# Patient Record
Sex: MALE | Race: WHITE | NOT HISPANIC OR LATINO | Employment: FULL TIME | URBAN - METROPOLITAN AREA
[De-identification: names, ages, dates, MRNs, and addresses within clinical notes are randomized per-mention and may not be internally consistent; named-entity substitution may affect disease eponyms.]

---

## 2017-01-09 ENCOUNTER — TELEPHONE (OUTPATIENT)
Dept: PULMONOLOGY | Facility: HOSPICE | Age: 42
End: 2017-01-09

## 2017-01-09 NOTE — TELEPHONE ENCOUNTER
Called and left the pt a  mssg to return my call.  He has an appt scheduled for tomorrow with Dr. Munroe and he did not show for his sleep study.

## 2017-05-23 ENCOUNTER — OFFICE VISIT (OUTPATIENT)
Dept: URGENT CARE | Facility: PHYSICIAN GROUP | Age: 42
End: 2017-05-23
Payer: COMMERCIAL

## 2017-05-23 VITALS
RESPIRATION RATE: 16 BRPM | SYSTOLIC BLOOD PRESSURE: 100 MMHG | BODY MASS INDEX: 39.23 KG/M2 | HEIGHT: 73 IN | OXYGEN SATURATION: 96 % | DIASTOLIC BLOOD PRESSURE: 70 MMHG | WEIGHT: 296 LBS | HEART RATE: 75 BPM | TEMPERATURE: 97.3 F

## 2017-05-23 DIAGNOSIS — R05.9 COUGH: ICD-10-CM

## 2017-05-23 DIAGNOSIS — J98.8 RTI (RESPIRATORY TRACT INFECTION): ICD-10-CM

## 2017-05-23 PROCEDURE — 99214 OFFICE O/P EST MOD 30 MIN: CPT | Performed by: PHYSICIAN ASSISTANT

## 2017-05-23 RX ORDER — PROMETHAZINE HYDROCHLORIDE AND CODEINE PHOSPHATE 6.25; 1 MG/5ML; MG/5ML
5 SYRUP ORAL 4 TIMES DAILY PRN
Qty: 120 ML | Refills: 0 | Status: SHIPPED | OUTPATIENT
Start: 2017-05-23 | End: 2017-06-30

## 2017-05-23 RX ORDER — AZITHROMYCIN 250 MG/1
TABLET, FILM COATED ORAL
Qty: 6 TAB | Refills: 0 | Status: SHIPPED | OUTPATIENT
Start: 2017-05-23 | End: 2017-06-30

## 2017-05-23 ASSESSMENT — ENCOUNTER SYMPTOMS
MYALGIAS: 1
RHINORRHEA: 0
SHORTNESS OF BREATH: 0
DIZZINESS: 0
SORE THROAT: 1
VOMITING: 0
CHILLS: 1
NAUSEA: 0
FEVER: 1
ABDOMINAL PAIN: 0
HEADACHES: 1
DIARRHEA: 0
COUGH: 1
PALPITATIONS: 0
SPUTUM PRODUCTION: 0
WHEEZING: 0

## 2017-05-23 ASSESSMENT — COPD QUESTIONNAIRES: COPD: 0

## 2017-05-23 NOTE — MR AVS SNAPSHOT
"        Vincent Goodwin Sessions   2017 4:45 PM   Office Visit   MRN: [id number]    Department:  [facility]   Dept Phone:  [phone]    Description:  Male : 1975   Provider:  Jay Mendez PA-C           Reason for Visit     Cough Chest feels tight, feverish, body aches, sore throat, loss of voice x 2-3 days       Allergies as of 2017     No Known Allergies      You were diagnosed with     RTI (respiratory tract infection)   [038806]       Cough   [786.2.ICD-9-CM]         Vital Signs     Blood Pressure Pulse Temperature Respirations Height Weight    100/70 mmHg 75 36.3 °C (97.3 °F) 16 1.854 m (6' 1\") 134.265 kg (296 lb)    Body Mass Index Oxygen Saturation Smoking Status             39.06 kg/m2 96% Never Smoker          Basic Information     Date Of Birth Sex Race Ethnicity Preferred Language    1975 Male White Non- English      Problem List              ICD-10-CM Priority Class Noted - Resolved    Obesity E66.9   2011 - Present    Snoring R06.83   2011 - Present    Sleep apnea G47.30   2011 - Present    Hyperlipidemia E78.5   2013 - Present    Labile hypertension I10   2013 - Present    History of right inguinal hernia repair Z98.890, Z87.19   2013 - Present    Left inguinal hernia K40.90   2013 - Present    Inguinal hernia, left K40.90   2013 - Present    Cough R05   2016 - Present    HTN (hypertension) (Chronic) I10 Low  2016 - Present    Asthma (Chronic) J45.909 Low  2016 - Present      Health Maintenance        Date Due Completion Dates    IMM PNEUMOCOCCAL 19-64 (ADULT) MEDIUM RISK SERIES (1 of 1 - PPSV23) 1994 ---    IMM DTaP/Tdap/Td Vaccine (2 - Td) 2021            Current Immunizations     Tdap Vaccine 2011      Below and/or attached are the medications your provider expects you to take. Review all of your home medications and newly ordered medications with your provider and/or pharmacist. " Follow medication instructions as directed by your provider and/or pharmacist. Please keep your medication list with you and share with your provider. Update the information when medications are discontinued, doses are changed, or new medications (including over-the-counter products) are added; and carry medication information at all times in the event of emergency situations     Allergies:  No Known Allergies          Medications  Valid as of: May 23, 2017 -  5:21 PM    Generic Name Brand Name Tablet Size Instructions for use    Albuterol Sulfate (Aero Soln) albuterol 108 (90 BASE) MCG/ACT Inhale 2 Puffs by mouth every 6 hours as needed for Shortness of Breath.        Azithromycin (Tab) ZITHROMAX 250 MG Z-mau, Use as directed.        Promethazine-Codeine (Syrup) PHENERGAN-CODEINE 6.25-10 MG/5ML Take 5 mL by mouth 4 times a day as needed for Cough.        .                 Medicines prescribed today were sent to:     [facility] [address] [place] [address], [place] - [address] [facility] [address] [place] [address] [address] [place] [address]    Phone: [phone] Fax: [phone]    Open 24 Hours?: No      Medication refill instructions:       If your prescription bottle indicates you have medication refills left, it is not necessary to call your provider’s office. Please contact your pharmacy and they will refill your medication.    If your prescription bottle indicates you do not have any refills left, you may request refills at any time through one of the following ways: The online [employer] system (except Urgent Care), by calling your provider’s office, or by asking your pharmacy to contact your provider’s office with a refill request. Medication refills are processed only during regular business hours and may not be available until the next business day. Your provider may request additional information or to have a follow-up visit with you prior to refilling your medication.   *Please Note: Medication refills are  assigned a new Rx number when refilled electronically. Your pharmacy may indicate that no refills were authorized even though a new prescription for the same medication is available at the pharmacy. Please request the medicine by name with the pharmacy before contacting your provider for a refill.           MyChart Access Code: Activation code not generated  Current [employer] Status: Active

## 2017-05-24 NOTE — PROGRESS NOTES
Subjective:      Vincent Mishra is a 42 y.o. male who presents with Cough            Cough  This is a new problem. The current episode started in the past 7 days. The problem has been gradually worsening. The problem occurs every few minutes. The cough is non-productive. Associated symptoms include chills, a fever, headaches, myalgias and a sore throat. Pertinent negatives include no chest pain, ear pain, nasal congestion, rhinorrhea, shortness of breath or wheezing. He has tried OTC cough suppressant for the symptoms. The treatment provided mild relief. His past medical history is significant for asthma. There is no history of bronchitis, COPD or pneumonia.       PMH:  has a past medical history of Asthma; Pneumonia; Bronchitis; Nasal drainage; Chickenpox; and Tonsillitis. He also has no past medical history of Diabetes or Allergy.  MEDS:   Current outpatient prescriptions:   •  albuterol (VENTOLIN OR PROVENTIL) 108 (90 BASE) MCG/ACT Aero Soln inhalation aerosol, Inhale 2 Puffs by mouth every 6 hours as needed for Shortness of Breath., Disp: 8.5 g, Rfl: 3  ALLERGIES: No Known Allergies  SURGHX:   Past Surgical History   Procedure Laterality Date   • Hernia repair     • Appendectomy     • Inguinal hernia repair  5/24/2010     Performed by LOY ESPINAL at SURGERY SAME DAY [facility] ORS   • Inguinal hernia repair  2007     RIGHT   • Inguinal hernia repair  9/11/2013     Performed by Adrián Caro M.D. at [facility]GERY SAME DAY [facility] ORS     SOCHX:  reports that he has never smoked. He does not have any smokeless tobacco history on file. He reports that he drinks about 2.5 oz of alcohol per week. He reports that he does not use illicit drugs.  FH: family history includes Hyperlipidemia in his brother, father, paternal grandfather, and sister; Hypertension in his father; Psychiatry in his mother; Stroke in his maternal grandmother. There is no history of Diabetes, Heart Disease, or Cancer.      Review  "of Systems   Constitutional: Positive for fever, chills and malaise/fatigue.   HENT: Positive for congestion and sore throat. Negative for ear pain and rhinorrhea.    Respiratory: Positive for cough. Negative for sputum production, shortness of breath and wheezing.    Cardiovascular: Negative for chest pain, palpitations and leg swelling.   Gastrointestinal: Negative for nausea, vomiting, abdominal pain and diarrhea.   Musculoskeletal: Positive for myalgias. Negative for joint pain.   Neurological: Positive for headaches. Negative for dizziness.       Medications, Allergies, and current problem list reviewed today in Epic      Objective:     /70 mmHg  Pulse 75  Temp(Src) 36.3 °C (97.3 °F)  Resp 16  Ht 1.854 m (6' 1\")  Wt 134.265 kg (296 lb)  BMI 39.06 kg/m2  SpO2 96%     Physical Exam   Constitutional: He is oriented to person, place, and time. He appears well-developed and well-nourished. No distress.   HENT:   Head: Normocephalic and atraumatic.   Right Ear: Tympanic membrane and external ear normal.   Left Ear: Tympanic membrane and external ear normal.   Nose: Nose normal.   Mouth/Throat: Oropharynx is clear and moist. No oropharyngeal exudate.   Eyes: Conjunctivae and EOM are normal. Pupils are equal, round, and reactive to light. Right eye exhibits no discharge. Left eye exhibits no discharge.   Neck: Normal range of motion. Neck supple. No tracheal deviation present.   Cardiovascular: Normal rate, regular rhythm, normal heart sounds and intact distal pulses.    No murmur heard.  Pulmonary/Chest: Effort normal. No respiratory distress. He has decreased breath sounds. He has wheezes. He has no rales. He exhibits no tenderness.   Lymphadenopathy:     He has no cervical adenopathy.   Neurological: He is alert and oriented to person, place, and time.   Skin: Skin is warm and dry. He is not diaphoretic.   Psychiatric: He has a normal mood and affect. His behavior is normal. Judgment and thought content " normal.   Nursing note and vitals reviewed.              Assessment/Plan:     1. RTI (respiratory tract infection)  azithromycin (ZITHROMAX) 250 MG Tab    promethazine-codeine (PHENERGAN-CODEINE) 6.25-10 MG/5ML Syrup   2. Cough  promethazine-codeine (PHENERGAN-CODEINE) 6.25-10 MG/5ML Syrup     PO2 adequate.  Take full course of antibiotic  Cough medicine at night  [facility] Aware web site evaluation: I have obtained and reviewed patient utilization report from [facility] pharmacy database prior to writing prescription for controlled substance II, III or IV. Based on the report and my clinical assessment the prescription is medically necessary.   Patient is cautioned on sedation potential of narcotic medication; no drinking, driving or operating heavy machinery while on this medication.  OTC meds and conservative measures as discussed  Return to clinic or go to ED if symptoms worsen or persist. Indications for ED discussed at length. Patient voices understanding. Follow-up with your primary care provider in 3-5 days. Red flags discussed.    Please note that this dictation was created using voice recognition software. I have made every reasonable attempt to correct obvious errors, but I expect that there are errors of grammar and possibly content that I did not discover before finalizing the note.

## 2017-06-30 ENCOUNTER — OFFICE VISIT (OUTPATIENT)
Dept: MEDICAL GROUP | Facility: PHYSICIAN GROUP | Age: 42
End: 2017-06-30
Payer: COMMERCIAL

## 2017-06-30 VITALS
WEIGHT: 288 LBS | RESPIRATION RATE: 16 BRPM | HEIGHT: 73 IN | TEMPERATURE: 97.5 F | DIASTOLIC BLOOD PRESSURE: 80 MMHG | SYSTOLIC BLOOD PRESSURE: 122 MMHG | BODY MASS INDEX: 38.17 KG/M2 | HEART RATE: 116 BPM | OXYGEN SATURATION: 95 %

## 2017-06-30 DIAGNOSIS — E66.9 OBESITY (BMI 35.0-39.9 WITHOUT COMORBIDITY): ICD-10-CM

## 2017-06-30 DIAGNOSIS — F41.9 ANXIETY: ICD-10-CM

## 2017-06-30 DIAGNOSIS — E78.2 MIXED HYPERLIPIDEMIA: ICD-10-CM

## 2017-06-30 DIAGNOSIS — R05.8 ALLERGIC COUGH: ICD-10-CM

## 2017-06-30 PROBLEM — F32.1 MAJOR DEPRESSIVE DISORDER, SINGLE EPISODE, MODERATE (HCC): Status: ACTIVE | Noted: 2017-06-30

## 2017-06-30 PROCEDURE — 99214 OFFICE O/P EST MOD 30 MIN: CPT | Performed by: FAMILY MEDICINE

## 2017-06-30 RX ORDER — BENZONATATE 100 MG/1
100 CAPSULE ORAL 3 TIMES DAILY PRN
Qty: 60 CAP | Refills: 0 | Status: SHIPPED | OUTPATIENT
Start: 2017-06-30 | End: 2018-06-02

## 2017-06-30 RX ORDER — FLUOXETINE HYDROCHLORIDE 20 MG/1
CAPSULE ORAL
Qty: 90 CAP | Refills: 3 | Status: SHIPPED | OUTPATIENT
Start: 2017-06-30 | End: 2018-06-20 | Stop reason: SDUPTHER

## 2017-06-30 NOTE — MR AVS SNAPSHOT
"        Vincent Goodwin Sessions   2017 5:00 PM   Office Visit   MRN: [id number]    Department:  [facility]   Dept Phone:  [phone]    Description:  Male : 1975   Provider:  Humera Lloyd D.O.           Reason for Visit     Establish Care NU2U    Anxiety Rx      Allergies as of 2017     No Known Allergies      You were diagnosed with     Anxiety   [382918]       Mixed hyperlipidemia   [272.2.ICD-9-CM]       Obesity (BMI 35.0-39.9 without comorbidity) ([facility])   [530738]       Allergic cough   [418672]         Vital Signs     Blood Pressure Pulse Temperature Respirations Height Weight    122/80 mmHg 116 36.4 °C (97.5 °F) 16 1.854 m (6' 0.99\") 130.636 kg (288 lb)    Body Mass Index Oxygen Saturation Smoking Status             38.01 kg/m2 95% Never Smoker          Basic Information     Date Of Birth Sex Race Ethnicity Preferred Language    1975 Male White Non- English      Problem List              ICD-10-CM Priority Class Noted - Resolved    Sleep apnea G47.30   2011 - Present    Hyperlipidemia E78.5   2013 - Present    Asthma (Chronic) J45.909 Low  2016 - Present    Major depressive disorder, single episode, moderate (CMS-HCC) F32.1   2017 - Present    Obesity (BMI 35.0-39.9 without comorbidity) ([facility]) E66.9   2017 - Present      Health Maintenance        Date Due Completion Dates    IMM PNEUMOCOCCAL 19-64 (ADULT) MEDIUM RISK SERIES (1 of 1 - PPSV23) 1994 ---    IMM DTaP/Tdap/Td Vaccine (2 - Td) 2021            Current Immunizations     Tdap Vaccine 2011      Below and/or attached are the medications your provider expects you to take. Review all of your home medications and newly ordered medications with your provider and/or pharmacist. Follow medication instructions as directed by your provider and/or pharmacist. Please keep your medication list with you and share with your provider. Update the information when medications are " discontinued, doses are changed, or new medications (including over-the-counter products) are added; and carry medication information at all times in the event of emergency situations     Allergies:  No Known Allergies          Medications  Valid as of: June 30, 2017 -  5:17 PM    Generic Name Brand Name Tablet Size Instructions for use    Albuterol Sulfate (Aero Soln) albuterol 108 (90 BASE) MCG/ACT Inhale 2 Puffs by mouth every 6 hours as needed for Shortness of Breath.        Benzonatate (Cap) TESSALON 100 MG Take 1 Cap by mouth 3 times a day as needed for Cough.        FLUoxetine HCl (Cap) PROZAC 20 MG TAKE ONE CAPSULE BY MOUTH ONCE DAILY        .                 Medicines prescribed today were sent to:     [facility] [address] [place] [address] [place], [place] - [address] [facility]    [address] [place] [address]    Phone: [phone] Fax: [phone]    Open 24 Hours?: No      Medication refill instructions:       If your prescription bottle indicates you have medication refills left, it is not necessary to call your provider’s office. Please contact your pharmacy and they will refill your medication.    If your prescription bottle indicates you do not have any refills left, you may request refills at any time through one of the following ways: The online [employer] system (except Urgent Care), by calling your provider’s office, or by asking your pharmacy to contact your provider’s office with a refill request. Medication refills are processed only during regular business hours and may not be available until the next business day. Your provider may request additional information or to have a follow-up visit with you prior to refilling your medication.   *Please Note: Medication refills are assigned a new Rx number when refilled electronically. Your pharmacy may indicate that no refills were authorized even though a new prescription for the same medication is available at the pharmacy. Please  request the medicine by name with the pharmacy before contacting your provider for a refill.        Your To Do List     Future Labs/Procedures Complete By Expires    COMP METABOLIC PANEL  As directed 6/30/2018    LIPID PROFILE  As directed 6/30/2018         MyChart Access Code: Activation code not generated  Current [employer] Status: Active

## 2017-07-01 NOTE — PROGRESS NOTES
Subjective:     Chief Complaint   Patient presents with   • Establish Care     NU2U   • Anxiety     Rx       Vincent Mishra is a 42 y.o. male here today today to est care with new provider. Patient is a former patient of Dr. Leach who is since retired.    Patient reports anxiety has returned he would like to restart Prozac. Patient states that in the past he has been self-medicating with alcohol. He has not drank any alcohol in greater than 6 months. Patient reports anxiety due to stress with many and family. Patient denies depression, hopelessness, helplessness, suicidal ideations, or hallucinations.    Patient reports chronic nonproductive cough for 2 months. Patient has been on 2 rounds of antibiotics with improvement in nasal congestion without resolution of cough.    No Known Allergies  Current medicines (including changes today)  Current Outpatient Prescriptions   Medication Sig Dispense Refill   • fluoxetine (PROZAC) 20 MG Cap TAKE ONE CAPSULE BY MOUTH ONCE DAILY 90 Cap 3   • benzonatate (TESSALON) 100 MG Cap Take 1 Cap by mouth 3 times a day as needed for Cough. 60 Cap 0   • albuterol (VENTOLIN OR PROVENTIL) 108 (90 BASE) MCG/ACT Aero Soln inhalation aerosol Inhale 2 Puffs by mouth every 6 hours as needed for Shortness of Breath. 8.5 g 3     No current facility-administered medications for this visit.     Social History   Substance Use Topics   • Smoking status: Never Smoker    • Smokeless tobacco: Never Used      Comment: continue to avoid   • Alcohol Use: Yes      Comment: quit heavy use     Family Status   Relation Status Death Age   • Father Alive    • Mother Alive      Family History   Problem Relation Age of Onset   • Hyperlipidemia Father    • Hypertension Father    • Hyperlipidemia Sister    • Hyperlipidemia Brother    • Stroke Maternal Grandmother    • Hyperlipidemia Paternal Grandfather    • Diabetes Neg Hx    • Heart Disease Neg Hx    • Cancer Neg Hx    • Psychiatry Mother       "depression     He    has a past medical history of Asthma; Pneumonia; Bronchitis; Nasal drainage; Chickenpox; and Tonsillitis. He also has no past medical history of Diabetes or Allergy.        ROS  As per history of present illness. All others reviewed and negative       Objective:     Blood pressure 122/80, pulse 116, temperature 36.4 °C (97.5 °F), resp. rate 16, height 1.854 m (6' 0.99\"), weight 130.636 kg (288 lb), SpO2 95 %. Body mass index is 38.01 kg/(m^2).   Physical Exam:  Constitutional: Alert, no distress.  Skin: Warm, dry, good turgor, no rashes in visible areas.  Eye: Equal, round and reactive, conjunctiva clear, lids normal.  ENMT: Lips without lesions, oropharynx erythematous with cobblestoning, no exudate, moist oral mucosa, bilateral tympanic membranes: No bulging, no retraction, no fluid, nonerythematous, dull light reflex, external auditory canals: Clear, scant cerumen, erythematous  Neck: Trachea midline, no masses, no thyromegaly. No cervical or supraclavicular lymphadenopathy. Full ROM  Respiratory: Unlabored respiratory effort, good air movement, lungs clear to auscultation, no wheezes, no ronchi.  Cardiovascular:RRR, +S1, S2, no murmur, no peripheral edema, pedal and radial pulses equal and intact bilat  Abdomen: Soft, non-tender, no masses, no hepatosplenomegaly.  MSK:5/5 muscle strength in upper extremities as well as lower extremity bilaterally  Psych: Alert and oriented x3, appropriate affect and mood.  Neuro: CN2-12 intact, no gross motor or sensory deficits      Assessment and Plan:   The following treatment plan was discussed    1. Anxiety  Chronic: Patient was previously self-medicating with alcohol. Risks and benefits of SSRIs discussed. She declines counseling.  - fluoxetine (PROZAC) 20 MG Cap; TAKE ONE CAPSULE BY MOUTH ONCE DAILY  Dispense: 90 Cap; Refill: 3    2. Mixed hyperlipidemia  Seen in history. Recommend follow-up lab work.  - LIPID PROFILE; Future    3. Obesity (BMI " 35.0-39.9 without comorbidity) ([facility])  Pt educated on the increase of morbidity and mortality associated with excess weight including DM, Heart Disease, HTN, stroke, and sleep apnea.  Pt advised weight loss of 5% through reduced calorie, low fat diet and 150 mins of exercise a week  - Patient identified as having weight management issue.  Appropriate orders and counseling given.  - COMP METABOLIC PANEL; Future    4. Allergic cough  New complaint: Longevity of cough as well as physical exam consistent with allergies. Recommend over-the-counter antihistamine and Flonase.  - benzonatate (TESSALON) 100 MG Cap; Take 1 Cap by mouth 3 times a day as needed for Cough.  Dispense: 60 Cap; Refill: 0      Followup: Return in about 3 months (around 9/30/2017) for anxiety.    Please note that this dictation was created using voice recognition software. I have made every reasonable attempt to correct obvious errors, but I expect that there are errors of grammar and possibly content that I did not discover before finalizing the note.

## 2018-06-02 ENCOUNTER — APPOINTMENT (OUTPATIENT)
Dept: RADIOLOGY | Facility: MEDICAL CENTER | Age: 43
End: 2018-06-02
Attending: EMERGENCY MEDICINE
Payer: COMMERCIAL

## 2018-06-02 ENCOUNTER — HOSPITAL ENCOUNTER (EMERGENCY)
Facility: MEDICAL CENTER | Age: 43
End: 2018-06-02
Attending: EMERGENCY MEDICINE
Payer: COMMERCIAL

## 2018-06-02 VITALS
HEIGHT: 73 IN | BODY MASS INDEX: 36.23 KG/M2 | TEMPERATURE: 99 F | OXYGEN SATURATION: 94 % | DIASTOLIC BLOOD PRESSURE: 85 MMHG | HEART RATE: 81 BPM | WEIGHT: 273.37 LBS | SYSTOLIC BLOOD PRESSURE: 132 MMHG | RESPIRATION RATE: 18 BRPM

## 2018-06-02 DIAGNOSIS — N50.89 SCROTAL MASS: ICD-10-CM

## 2018-06-02 LAB
APPEARANCE UR: CLEAR
COLOR UR: YELLOW
GLUCOSE UR STRIP.AUTO-MCNC: NEGATIVE MG/DL
KETONES UR STRIP.AUTO-MCNC: 15 MG/DL
LEUKOCYTE ESTERASE UR QL STRIP.AUTO: NEGATIVE
NITRITE UR QL STRIP.AUTO: NEGATIVE
PH UR STRIP.AUTO: 6 [PH]
PROT UR QL STRIP: NEGATIVE MG/DL
RBC UR QL AUTO: NEGATIVE
SP GR UR STRIP.AUTO: 1.02

## 2018-06-02 PROCEDURE — 99284 EMERGENCY DEPT VISIT MOD MDM: CPT

## 2018-06-02 PROCEDURE — 81002 URINALYSIS NONAUTO W/O SCOPE: CPT

## 2018-06-02 PROCEDURE — 76870 US EXAM SCROTUM: CPT

## 2018-06-02 RX ORDER — DIPHENHYDRAMINE HCL 25 MG
25 TABLET ORAL NIGHTLY PRN
Status: SHIPPED | COMMUNITY
End: 2022-02-02

## 2018-06-02 RX ORDER — CETIRIZINE HYDROCHLORIDE 10 MG/1
10 TABLET ORAL
Status: SHIPPED | COMMUNITY
End: 2022-02-02

## 2018-06-02 ASSESSMENT — PAIN SCALES - GENERAL: PAINLEVEL_OUTOF10: 2

## 2018-06-02 NOTE — ED NOTES
The Medication Reconciliation process has been completed by interviewing the patient    Allergies have been reviewed  Antibiotic use in 30 days - none    Home Pharmacy:  [facility]

## 2018-06-02 NOTE — ED NOTES
"Pt is accompanied by family; he C/O a slightly tender right scrotal mass \"noticed recently\".  He denies any trauma.   "

## 2018-06-02 NOTE — ED PROVIDER NOTES
"ED Provider Note  CHIEF COMPLAINT  Chief Complaint   Patient presents with   • Mass   • Scrotal Pain       HPI  Vincent Mishra is a 43 y.o. male who presents with mass on scrotum, 1st noted about a day ago.. No pain. He does not know how long this masses. Here he just noticed it yesterday when he was palpating that area. No fever no chills no nausea no vomiting no dysuria urgency frequency no other masses.    REVIEW OF SYSTEMS  See HPI for further details.     PAST MEDICAL HISTORY  Past Medical History:   Diagnosis Date   • Asthma    • Bronchitis    • Chickenpox    • Nasal drainage    • Pneumonia    • Tonsillitis          SOCIAL HISTORY        CURRENT MEDICATIONS  Home Medications    **Home medications have not yet been reviewed for this encounter**         ALLERGIES  No Known Allergies    PHYSICAL EXAM  VITAL SIGNS: /85   Pulse 86   Temp 37.2 °C (99 °F)   Resp 18   Ht 1.854 m (6' 1\") Comment: Stated  Wt 124 kg (273 lb 5.9 oz)   SpO2 98%   BMI 36.07 kg/m²    Constitutional: Well developed, Well nourished, No acute distress, Non-toxic appearance.   HENT: Normocephalic, Atraumatic, Bilateral external ears normal, Oropharynx moist, No oral exudates, Nose normal.   Eyes: PERRLA, EOMI, Conjunctiva normal, No discharge.   Neck: Normal range of motion, No tenderness, Supple, No stridor.   Cardiovascular:  Heart sounds are normal no murmurs or rubs  Thorax & Lungs: Lungs are clear equal breath hands bilaterally no rhonchi rales or wheezes. Chest wall motion is nonlabored  Abdomen: Bowel sounds normal, Soft, No tenderness, No masses, No pulsatile masses.   Skin: Warm, Dry, No erythema, No rash.   Neurologic: Alert & oriented x 3, Normal motor function, Normal sensory function, No focal deficits noted.     Oral exam. The left testicle is larger than the right. There is a small nodule, maybe 2 mm in diameter to inferior pole of left testicle.    COURSE & MEDICAL DECISION MAKING  Pertinent Labs & Imaging " studies reviewed. (See chart for details)   [id number]   Final Result      1.  Hypoechoic right testicular masses, highly suspicious for neoplasm.      2.  Bilateral varicoceles                patient is quite concerned about this scrotal mass so ultrasound is done today.  There is concern about a possible tumor. I will talk with the on-call urologist for further disposition. I have talked with Dr. Lovett, neurology, who is going to make arrangements to make sure this patient is seen in the office within the weekDischarge instructions are understood. This patient is to return if fever vomiting or no better in 12 hours. Follow up with the [facility] clinic or private physician. Information sheets on  FINAL IMPRESSION  1.  1. Scrotal mass        2.   3.    Disposition:  Discharge instructions are understood. This patient is to return if fever vomiting or no better in 12 hours. Follow up with the urology. Information sheets on scrotal mass    Electronically signed by: William Arias, 6/2/2018 2:30 PM

## 2018-06-02 NOTE — DISCHARGE INSTRUCTIONS
Return if fever, vomiting or if no better in 12 hours.Scrotal Masses  Scrotal swelling is common in men of all ages. Common types of testicular masses include:   · Hydrocele. The most common benign testicular mass in an adult. Hydroceles are generally soft and painless collections of fluid in the scrotal sac. These can rapidly change size as the fluid enters or leaves. Hydroceles can be associated with an underlying cancer of the testicle.  · Spermatoceles. Generally soft and painless cyst-like masses in the scrotum that contain fluid, usually above the testicle. They can rapidly change size as the fluid enters or leaves. They are more prominent while standing or exercising. Sometimes, spermatoceles may cause a sensation of heaviness or a dull ache.  · Orchitis. Inflammation of the testicle. It is painful and may be associated with a fever or symptoms of a urinary tract infection, including frequent and painful urination. It is common in males who have the mumps.  · Varicocele. An enlargement of the veins that drain the testicles. Varicoceles usually occur on the left side of the scrotum. This condition can increase the risk of infertility. Varicocele is sometimes more prominent while standing or exercising. Sometimes, varicoceles may cause a sensation of heaviness or a dull ache.  · Inguinal hernia. A bulge caused by a portion of intestine protruding into the scrotum through a weak area in the abdominal muscles. Hernias may or may not be painful. They are soft and usually enlarge with coughing or straining.  · Torsion of the testis. This can cause a testicular mass that develops quickly and is associated with tenderness or fever, or both. It is caused by a twisting of the testicle within the sac. It also reduces the blood supply and can destroy the testis if not treated quickly with surgery.  · Epididymitis. Inflammation of the epididymis (a structure attached above and behind the testicle), usually caused by a  urinary tract infection or a sexually transmitted infection. This generally shows up as testicular discomfort and swelling and may include pain during urination. It is frequently associated with a testicle infection.  · Testicular appendages. Remnants of tissue on the testis present since birth. A testicular appendage can twist on its blood supply and cause pain. In most cases, this is seen as a blue dot on the scrotum.  · Hematocele. A collection of blood between the layers of the sac inside the scrotum. It usually is caused by trauma to the scrotum.  · Sebaceous cysts. These can be a swelling in the skin of the scrotum and are usually painless.  · Cancer (carcinoma) of the skin of the scrotum. It can cause scrotal swelling, but this is rare.     This information is not intended to replace advice given to you by your health care provider. Make sure you discuss any questions you have with your health care provider.     Document Released: 06/23/2004 Document Revised: 08/20/2014 Document Reviewed: 06/09/2014  [employer] Interactive Patient Education ©2016 [employer]

## 2018-06-02 NOTE — ED NOTES
.Pt D/C to home. VSS. D/C instructions given to patient. Pt to call for f/u urology apt today and if not today then on Monday. Pt verbalizes understanding. Pt leaves ED with no acute changes, complaints or concerns. Pt ambulated out with a steady gait and all belongings.

## 2018-06-03 ENCOUNTER — PATIENT OUTREACH (OUTPATIENT)
Dept: HEALTH INFORMATION MANAGEMENT | Facility: OTHER | Age: 43
End: 2018-06-03

## 2018-06-03 NOTE — PROGRESS NOTES
Placed discharge outreach phone call to pt s/p ER discharge 6/2/18.  Left voicemail providing my contact information and instructions to call with any questions or concerns.

## 2018-06-06 ENCOUNTER — HOSPITAL ENCOUNTER (OUTPATIENT)
Dept: RADIOLOGY | Facility: MEDICAL CENTER | Age: 43
End: 2018-06-06
Attending: UROLOGY
Payer: COMMERCIAL

## 2018-06-06 ENCOUNTER — HOSPITAL ENCOUNTER (OUTPATIENT)
Dept: LAB | Facility: MEDICAL CENTER | Age: 43
End: 2018-06-06
Attending: UROLOGY
Payer: COMMERCIAL

## 2018-06-06 DIAGNOSIS — D40.11 NEOPLASM OF UNCERTAIN BEHAVIOR OF RIGHT TESTIS: ICD-10-CM

## 2018-06-06 LAB
ALBUMIN SERPL BCP-MCNC: 4.1 G/DL (ref 3.2–4.9)
ALBUMIN/GLOB SERPL: 1.2 G/DL
ALP SERPL-CCNC: 80 U/L (ref 30–99)
ALT SERPL-CCNC: 32 U/L (ref 2–50)
ANION GAP SERPL CALC-SCNC: 8 MMOL/L (ref 0–11.9)
APTT PPP: 33.3 SEC (ref 24.7–36)
AST SERPL-CCNC: 21 U/L (ref 12–45)
B-HCG SERPL-ACNC: <0.6 MIU/ML (ref 0–5)
BASOPHILS # BLD AUTO: 0.4 % (ref 0–1.8)
BASOPHILS # BLD: 0.02 K/UL (ref 0–0.12)
BILIRUB SERPL-MCNC: 0.7 MG/DL (ref 0.1–1.5)
BUN SERPL-MCNC: 14 MG/DL (ref 8–22)
CALCIUM SERPL-MCNC: 9.1 MG/DL (ref 8.5–10.5)
CHLORIDE SERPL-SCNC: 104 MMOL/L (ref 96–112)
CO2 SERPL-SCNC: 28 MMOL/L (ref 20–33)
CREAT SERPL-MCNC: 0.83 MG/DL (ref 0.5–1.4)
EOSINOPHIL # BLD AUTO: 0.05 K/UL (ref 0–0.51)
EOSINOPHIL NFR BLD: 1 % (ref 0–6.9)
ERYTHROCYTE [DISTWIDTH] IN BLOOD BY AUTOMATED COUNT: 41.2 FL (ref 35.9–50)
GLOBULIN SER CALC-MCNC: 3.4 G/DL (ref 1.9–3.5)
GLUCOSE SERPL-MCNC: 82 MG/DL (ref 65–99)
HCT VFR BLD AUTO: 48 % (ref 42–52)
HGB BLD-MCNC: 15.7 G/DL (ref 14–18)
IMM GRANULOCYTES # BLD AUTO: 0.02 K/UL (ref 0–0.11)
IMM GRANULOCYTES NFR BLD AUTO: 0.4 % (ref 0–0.9)
INR PPP: 1.08 (ref 0.87–1.13)
LDH SERPL L TO P-CCNC: 202 U/L (ref 107–266)
LYMPHOCYTES # BLD AUTO: 1.38 K/UL (ref 1–4.8)
LYMPHOCYTES NFR BLD: 28.6 % (ref 22–41)
MCH RBC QN AUTO: 28.6 PG (ref 27–33)
MCHC RBC AUTO-ENTMCNC: 32.7 G/DL (ref 33.7–35.3)
MCV RBC AUTO: 87.4 FL (ref 81.4–97.8)
MONOCYTES # BLD AUTO: 0.44 K/UL (ref 0–0.85)
MONOCYTES NFR BLD AUTO: 9.1 % (ref 0–13.4)
NEUTROPHILS # BLD AUTO: 2.91 K/UL (ref 1.82–7.42)
NEUTROPHILS NFR BLD: 60.5 % (ref 44–72)
NRBC # BLD AUTO: 0 K/UL
NRBC BLD-RTO: 0 /100 WBC
PLATELET # BLD AUTO: 245 K/UL (ref 164–446)
PMV BLD AUTO: 11.7 FL (ref 9–12.9)
POTASSIUM SERPL-SCNC: 3.9 MMOL/L (ref 3.6–5.5)
PROT SERPL-MCNC: 7.5 G/DL (ref 6–8.2)
PROTHROMBIN TIME: 13.7 SEC (ref 12–14.6)
RBC # BLD AUTO: 5.49 M/UL (ref 4.7–6.1)
SODIUM SERPL-SCNC: 140 MMOL/L (ref 135–145)
WBC # BLD AUTO: 4.8 K/UL (ref 4.8–10.8)

## 2018-06-06 PROCEDURE — 84702 CHORIONIC GONADOTROPIN TEST: CPT

## 2018-06-06 PROCEDURE — 85025 COMPLETE CBC W/AUTO DIFF WBC: CPT

## 2018-06-06 PROCEDURE — 71260 CT THORAX DX C+: CPT

## 2018-06-06 PROCEDURE — 82105 ALPHA-FETOPROTEIN SERUM: CPT

## 2018-06-06 PROCEDURE — 36415 COLL VENOUS BLD VENIPUNCTURE: CPT

## 2018-06-06 PROCEDURE — 700117 HCHG RX CONTRAST REV CODE 255: Performed by: UROLOGY

## 2018-06-06 PROCEDURE — 85610 PROTHROMBIN TIME: CPT

## 2018-06-06 PROCEDURE — 80053 COMPREHEN METABOLIC PANEL: CPT

## 2018-06-06 PROCEDURE — 83615 LACTATE (LD) (LDH) ENZYME: CPT

## 2018-06-06 PROCEDURE — 85730 THROMBOPLASTIN TIME PARTIAL: CPT

## 2018-06-06 RX ADMIN — IOHEXOL 50 ML: 240 INJECTION, SOLUTION INTRATHECAL; INTRAVASCULAR; INTRAVENOUS; ORAL at 19:23

## 2018-06-06 RX ADMIN — IOHEXOL 100 ML: 350 INJECTION, SOLUTION INTRAVENOUS at 19:23

## 2018-06-07 LAB — AFP-TM SERPL-MCNC: 2 NG/ML (ref 0–9)

## 2018-06-20 DIAGNOSIS — F41.9 ANXIETY: ICD-10-CM

## 2018-06-20 RX ORDER — FLUOXETINE HYDROCHLORIDE 20 MG/1
CAPSULE ORAL
Qty: 90 CAP | Refills: 0 | Status: SHIPPED | OUTPATIENT
Start: 2018-06-20 | End: 2018-09-02 | Stop reason: SDUPTHER

## 2018-06-20 NOTE — TELEPHONE ENCOUNTER
Was the patient seen in the last year in this department? Yes     Does patient have an active prescription for medications requested? No     Received Request Via: Pharmacy      Pt met protocol?: Yes, last ov 6/30/17  No appt scheduled    [facility] requesting.

## 2018-09-02 DIAGNOSIS — F41.9 ANXIETY: ICD-10-CM

## 2018-09-04 RX ORDER — FLUOXETINE HYDROCHLORIDE 20 MG/1
CAPSULE ORAL
Qty: 30 CAP | Refills: 0 | Status: SHIPPED | OUTPATIENT
Start: 2018-09-04 | End: 2020-02-13

## 2018-09-04 NOTE — TELEPHONE ENCOUNTER
Pt was told 6/18 to make appt and that has not been done. Please advise pt only 30 days will be sent to pharmacy and next request will be denied.

## 2019-04-26 ENCOUNTER — OFFICE VISIT (OUTPATIENT)
Dept: URGENT CARE | Facility: PHYSICIAN GROUP | Age: 44
End: 2019-04-26
Payer: COMMERCIAL

## 2019-04-26 VITALS
HEART RATE: 100 BPM | HEIGHT: 73 IN | BODY MASS INDEX: 37.11 KG/M2 | RESPIRATION RATE: 16 BRPM | SYSTOLIC BLOOD PRESSURE: 134 MMHG | DIASTOLIC BLOOD PRESSURE: 80 MMHG | OXYGEN SATURATION: 96 % | WEIGHT: 280 LBS | TEMPERATURE: 97.9 F

## 2019-04-26 DIAGNOSIS — J01.90 ACUTE RHINOSINUSITIS: ICD-10-CM

## 2019-04-26 DIAGNOSIS — J30.9 ALLERGIC RHINITIS, UNSPECIFIED SEASONALITY, UNSPECIFIED TRIGGER: ICD-10-CM

## 2019-04-26 DIAGNOSIS — J45.20 MILD INTERMITTENT REACTIVE AIRWAY DISEASE WITHOUT COMPLICATION: ICD-10-CM

## 2019-04-26 DIAGNOSIS — H65.91 RIGHT SEROUS OTITIS MEDIA, UNSPECIFIED CHRONICITY: ICD-10-CM

## 2019-04-26 PROCEDURE — 99214 OFFICE O/P EST MOD 30 MIN: CPT | Performed by: EMERGENCY MEDICINE

## 2019-04-26 RX ORDER — AMOXICILLIN AND CLAVULANATE POTASSIUM 875; 125 MG/1; MG/1
1 TABLET, FILM COATED ORAL 2 TIMES DAILY
Qty: 14 TAB | Refills: 0 | Status: SHIPPED | OUTPATIENT
Start: 2019-04-26 | End: 2020-02-13

## 2019-04-26 RX ORDER — INHALER, ASSIST DEVICES
1 SPACER (EA) MISCELLANEOUS ONCE
Qty: 1 EACH | Refills: 0 | Status: SHIPPED | OUTPATIENT
Start: 2019-04-26 | End: 2019-04-26

## 2019-04-26 RX ORDER — LEVALBUTEROL TARTRATE 45 UG/1
1-2 AEROSOL, METERED ORAL EVERY 4 HOURS PRN
Qty: 1 INHALER | Refills: 0 | Status: SHIPPED | OUTPATIENT
Start: 2019-04-26 | End: 2020-02-13

## 2019-04-26 ASSESSMENT — ENCOUNTER SYMPTOMS
COUGH: 1
HEMOPTYSIS: 0
RHINORRHEA: 1
DIARRHEA: 0
SINUS PAIN: 1
SPUTUM PRODUCTION: 0
SHORTNESS OF BREATH: 0
HEARTBURN: 0
NAUSEA: 0
ABDOMINAL PAIN: 0
VOMITING: 0
WHEEZING: 1

## 2019-04-27 NOTE — PROGRESS NOTES
Subjective:      Vincent Mishra is a 44 y.o. male who presents with Cough (chest congestion x 1 month )            URI    This is a new problem. Episode onset: over 4 weeks. The problem has been unchanged. Maximum temperature: initially. Associated symptoms include congestion, coughing, a plugged ear sensation, rhinorrhea, sinus pain and wheezing. Pertinent negatives include no abdominal pain, chest pain, diarrhea, ear pain, nausea, rash or vomiting. He has tried sleep for the symptoms. The treatment provided no relief.   Notes poorly tolerant of albuterol in the past; agitation.    Review of Systems   HENT: Positive for congestion, rhinorrhea and sinus pain. Negative for ear discharge, ear pain, hearing loss, nosebleeds and tinnitus.         Postnasal drip bad tase   Respiratory: Positive for cough and wheezing. Negative for hemoptysis, sputum production and shortness of breath.    Cardiovascular: Negative for chest pain.   Gastrointestinal: Negative for abdominal pain, diarrhea, heartburn, nausea and vomiting.   Skin: Negative for rash.   Endo/Heme/Allergies: Positive for environmental allergies.     PMH:  has a past medical history of Asthma; Bronchitis; Chickenpox; Nasal drainage; Pneumonia; and Tonsillitis. He also has no past medical history of Allergy or Diabetes.  MEDS:   Current Outpatient Prescriptions:   •  levalbuterol (XOPENEX HFA) 45 MCG/ACT inhaler, Inhale 1-2 Puffs by mouth every four hours as needed (coughing)., Disp: 1 Inhaler, Rfl: 0  •  Spacer/Aero-Holding Chambers (AEROCHAMBER MV) Misc, 1 Each by Does not apply route Once for 1 dose., Disp: 1 Each, Rfl: 0  •  amoxicillin-clavulanate (AUGMENTIN) 875-125 MG Tab, Take 1 Tab by mouth 2 times a day., Disp: 14 Tab, Rfl: 0  •  FLUoxetine (PROZAC) 20 MG Cap, TAKE 1 CAPSULE DAILY (MAKE APPOINTMENT PRIOR TO MORE REFILLS) (Patient not taking: Reported on 4/26/2019), Disp: 30 Cap, Rfl: 0  •  cetirizine (ZYRTEC) 10 MG Tab, Take 10 mg by mouth 1 time  "daily as needed for Allergies., Disp: , Rfl:   •  diphenhydrAMINE (BENADRYL) 25 MG Tab, Take 25 mg by mouth at bedtime as needed for Sleep., Disp: , Rfl:   ALLERGIES: No Known Allergies  SURGHX:   Past Surgical History:   Procedure Laterality Date   • INGUINAL HERNIA REPAIR  9/11/2013    Performed by Adrián Caro M.D. at SURGERY SAME DAY [facility]   • INGUINAL HERNIA REPAIR  5/24/2010    Performed by LOY ESPINAL at SURGERY SAME DAY [facility]   • INGUINAL HERNIA REPAIR  2007    RIGHT   • APPENDECTOMY     • HERNIA REPAIR       SOCHX:  reports that he has never smoked. He has never used smokeless tobacco. He reports that he does not drink alcohol or use drugs.  FH: family history includes Hyperlipidemia in his brother, father, paternal grandfather, and sister; Hypertension in his father; Psychiatry in his mother; Stroke in his maternal grandmother.       Objective:     /80   Pulse 100   Temp 36.6 °C (97.9 °F) (Temporal)   Resp 16   Ht 1.854 m (6' 1\")   Wt (!) 127 kg (280 lb)   SpO2 96%   BMI 36.94 kg/m²      Physical Exam   Constitutional: He is oriented to person, place, and time. He appears well-developed and well-nourished. He is cooperative.  Non-toxic appearance. He does not appear ill. No distress.   HENT:   Head: Normocephalic.   Right Ear: External ear and ear canal normal. No mastoid tenderness. Tympanic membrane is not injected, not erythematous and not bulging. A middle ear effusion is present. No decreased hearing is noted.   Left Ear: Tympanic membrane, external ear and ear canal normal.   Nose: Mucosal edema present. No rhinorrhea. Right sinus exhibits maxillary sinus tenderness. Right sinus exhibits no frontal sinus tenderness. Left sinus exhibits no maxillary sinus tenderness and no frontal sinus tenderness.   Mouth/Throat: Uvula is midline. No trismus in the jaw. No oropharyngeal exudate, posterior oropharyngeal edema or posterior oropharyngeal erythema.   Eyes: " Conjunctivae are normal.   Neck: Trachea normal. Neck supple.   Cardiovascular: Normal rate, regular rhythm and normal heart sounds.  Exam reveals no gallop.    No murmur heard.  No ankle edema   Pulmonary/Chest: Effort normal. He has no decreased breath sounds. He has wheezes in the right upper field and the left upper field. He has rhonchi. He has no rales.   Rhonchi clear with cough.   Lymphadenopathy:     He has no cervical adenopathy.   Neurological: He is alert and oriented to person, place, and time.   Skin: Skin is warm and dry.   Psychiatric: He has a normal mood and affect.               Assessment/Plan:     1. Acute rhinosinusitis  Recommended supportive care measures, including rest, increasing oral fluid intake and use of over-the-counter medications for relief of symptoms.  Based on duration:  - amoxicillin-clavulanate (AUGMENTIN) 875-125 MG Tab; Take 1 Tab by mouth 2 times a day.  Dispense: 14 Tab; Refill: 0    2. Mild intermittent reactive airway disease without complication  - levalbuterol (XOPENEX HFA) 45 MCG/ACT inhaler; Inhale 1-2 Puffs by mouth every four hours as needed (coughing).  Dispense: 1 Inhaler; Refill: 0  - Spacer/Aero-Holding Chambers (AEROCHAMBER MV) Misc; 1 Each by Does not apply route Once for 1 dose.  Dispense: 1 Each; Refill: 0    3. Right serous otitis media, unspecified chronicity    4. Allergic rhinitis, unspecified seasonality, unspecified trigger  OTC inhaled nasal steroid daily.

## 2019-08-09 ENCOUNTER — HOSPITAL ENCOUNTER (OUTPATIENT)
Dept: RADIOLOGY | Facility: MEDICAL CENTER | Age: 44
End: 2019-08-09
Attending: NURSE PRACTITIONER
Payer: COMMERCIAL

## 2019-08-09 ENCOUNTER — HOSPITAL ENCOUNTER (OUTPATIENT)
Dept: LAB | Facility: MEDICAL CENTER | Age: 44
End: 2019-08-09
Attending: NURSE PRACTITIONER
Payer: COMMERCIAL

## 2019-08-09 ENCOUNTER — OFFICE VISIT (OUTPATIENT)
Dept: URGENT CARE | Facility: PHYSICIAN GROUP | Age: 44
End: 2019-08-09
Payer: COMMERCIAL

## 2019-08-09 VITALS
HEIGHT: 72 IN | DIASTOLIC BLOOD PRESSURE: 82 MMHG | TEMPERATURE: 97.5 F | OXYGEN SATURATION: 99 % | SYSTOLIC BLOOD PRESSURE: 106 MMHG | HEART RATE: 76 BPM | BODY MASS INDEX: 36.84 KG/M2 | WEIGHT: 272 LBS

## 2019-08-09 DIAGNOSIS — R10.13 EPIGASTRIC PAIN: ICD-10-CM

## 2019-08-09 DIAGNOSIS — K52.9 AGE (ACUTE GASTROENTERITIS): ICD-10-CM

## 2019-08-09 LAB
ALBUMIN SERPL BCP-MCNC: 4.4 G/DL (ref 3.2–4.9)
ALBUMIN/GLOB SERPL: 1.3 G/DL
ALP SERPL-CCNC: 81 U/L (ref 30–99)
ALT SERPL-CCNC: 34 U/L (ref 2–50)
AMYLASE SERPL-CCNC: 34 U/L (ref 20–103)
ANION GAP SERPL CALC-SCNC: 8 MMOL/L (ref 0–11.9)
AST SERPL-CCNC: 21 U/L (ref 12–45)
BASOPHILS # BLD AUTO: 0.5 % (ref 0–1.8)
BASOPHILS # BLD: 0.02 K/UL (ref 0–0.12)
BILIRUB SERPL-MCNC: 1.1 MG/DL (ref 0.1–1.5)
BUN SERPL-MCNC: 17 MG/DL (ref 8–22)
CALCIUM SERPL-MCNC: 9.5 MG/DL (ref 8.5–10.5)
CHLORIDE SERPL-SCNC: 101 MMOL/L (ref 96–112)
CO2 SERPL-SCNC: 27 MMOL/L (ref 20–33)
CREAT SERPL-MCNC: 1.05 MG/DL (ref 0.5–1.4)
EOSINOPHIL # BLD AUTO: 0.02 K/UL (ref 0–0.51)
EOSINOPHIL NFR BLD: 0.5 % (ref 0–6.9)
ERYTHROCYTE [DISTWIDTH] IN BLOOD BY AUTOMATED COUNT: 46.2 FL (ref 35.9–50)
FASTING STATUS PATIENT QL REPORTED: NORMAL
GLOBULIN SER CALC-MCNC: 3.4 G/DL (ref 1.9–3.5)
GLUCOSE SERPL-MCNC: 96 MG/DL (ref 65–99)
HCT VFR BLD AUTO: 51.5 % (ref 42–52)
HGB BLD-MCNC: 15.8 G/DL (ref 14–18)
IMM GRANULOCYTES # BLD AUTO: 0 K/UL (ref 0–0.11)
IMM GRANULOCYTES NFR BLD AUTO: 0 % (ref 0–0.9)
LIPASE SERPL-CCNC: 4 U/L (ref 11–82)
LYMPHOCYTES # BLD AUTO: 0.65 K/UL (ref 1–4.8)
LYMPHOCYTES NFR BLD: 16.8 % (ref 22–41)
MCH RBC QN AUTO: 27.5 PG (ref 27–33)
MCHC RBC AUTO-ENTMCNC: 30.7 G/DL (ref 33.7–35.3)
MCV RBC AUTO: 89.7 FL (ref 81.4–97.8)
MONOCYTES # BLD AUTO: 0.44 K/UL (ref 0–0.85)
MONOCYTES NFR BLD AUTO: 11.4 % (ref 0–13.4)
NEUTROPHILS # BLD AUTO: 2.73 K/UL (ref 1.82–7.42)
NEUTROPHILS NFR BLD: 70.8 % (ref 44–72)
NRBC # BLD AUTO: 0 K/UL
NRBC BLD-RTO: 0 /100 WBC
PLATELET # BLD AUTO: 212 K/UL (ref 164–446)
PMV BLD AUTO: 11.6 FL (ref 9–12.9)
POTASSIUM SERPL-SCNC: 3.7 MMOL/L (ref 3.6–5.5)
PROT SERPL-MCNC: 7.8 G/DL (ref 6–8.2)
RBC # BLD AUTO: 5.74 M/UL (ref 4.7–6.1)
SODIUM SERPL-SCNC: 136 MMOL/L (ref 135–145)
WBC # BLD AUTO: 3.9 K/UL (ref 4.8–10.8)

## 2019-08-09 PROCEDURE — 85025 COMPLETE CBC W/AUTO DIFF WBC: CPT

## 2019-08-09 PROCEDURE — 36415 COLL VENOUS BLD VENIPUNCTURE: CPT

## 2019-08-09 PROCEDURE — 82150 ASSAY OF AMYLASE: CPT

## 2019-08-09 PROCEDURE — 83690 ASSAY OF LIPASE: CPT

## 2019-08-09 PROCEDURE — 80053 COMPREHEN METABOLIC PANEL: CPT

## 2019-08-09 PROCEDURE — 76705 ECHO EXAM OF ABDOMEN: CPT

## 2019-08-09 PROCEDURE — 99213 OFFICE O/P EST LOW 20 MIN: CPT | Performed by: NURSE PRACTITIONER

## 2019-08-09 ASSESSMENT — ENCOUNTER SYMPTOMS
HEADACHES: 1
FEVER: 0
NAUSEA: 1
ABDOMINAL PAIN: 1
VOMITING: 1
MYALGIAS: 1
WEIGHT LOSS: 0

## 2019-08-09 NOTE — LETTER
August 9, 2019        Vincent Goodwin Sessions  [address] [address]        Vincent was seen in our clinic today and he is excused from work for yesterday and today. Thank you.  If you have any questions or concerns, please don't hesitate to call.        Sincerely,        MARITZA Paz.SPRING.    Electronically Signed

## 2019-08-09 NOTE — PROGRESS NOTES
Subjective:      Vincent Mishra is a 44 y.o. male who presents with Emesis (Diarrhea, headache, chills, upper abdominal pain x1 days )            HPI  New. 44 year old male with vomiting, diarrhea, headache and myalgia yesterday. He reports improvement in this today other than residual headache and aching in his legs. Additionally, he reports intermittent abdominal pain that radiates at times to his back with associated nausea on most mornings. States feels like a hangover. Used to be a fairly heavy drinker (quite 2 years ago). Worried about gall bladder. States pain is epigastric with radiation to back.  Patient has no known allergies.  Current Outpatient Medications on File Prior to Visit   Medication Sig Dispense Refill   • levalbuterol (XOPENEX HFA) 45 MCG/ACT inhaler Inhale 1-2 Puffs by mouth every four hours as needed (coughing). (Patient not taking: Reported on 8/9/2019) 1 Inhaler 0   • amoxicillin-clavulanate (AUGMENTIN) 875-125 MG Tab Take 1 Tab by mouth 2 times a day. (Patient not taking: Reported on 8/9/2019) 14 Tab 0   • FLUoxetine (PROZAC) 20 MG Cap TAKE 1 CAPSULE DAILY (MAKE APPOINTMENT PRIOR TO MORE REFILLS) (Patient not taking: Reported on 4/26/2019) 30 Cap 0   • cetirizine (ZYRTEC) 10 MG Tab Take 10 mg by mouth 1 time daily as needed for Allergies.     • diphenhydrAMINE (BENADRYL) 25 MG Tab Take 25 mg by mouth at bedtime as needed for Sleep.       No current facility-administered medications on file prior to visit.      Social History     Socioeconomic History   • Marital status:      Spouse name: Not on file   • Number of children: Not on file   • Years of education: Not on file   • Highest education level: Not on file   Occupational History   • Not on file   Social Needs   • Financial resource strain: Not on file   • Food insecurity:     Worry: Not on file     Inability: Not on file   • Transportation needs:     Medical: Not on file     Non-medical: Not on file   Tobacco Use   •  Smoking status: Never Smoker   • Smokeless tobacco: Never Used   • Tobacco comment: continue to avoid   Substance and Sexual Activity   • Alcohol use: No   • Drug use: No   • Sexual activity: Yes     Partners: Female   Lifestyle   • Physical activity:     Days per week: Not on file     Minutes per session: Not on file   • Stress: Not on file   Relationships   • Social connections:     Talks on phone: Not on file     Gets together: Not on file     Attends Bahai service: Not on file     Active member of club or organization: Not on file     Attends meetings of clubs or organizations: Not on file     Relationship status: Not on file   • Intimate partner violence:     Fear of current or ex partner: Not on file     Emotionally abused: Not on file     Physically abused: Not on file     Forced sexual activity: Not on file   Other Topics Concern   • Not on file   Social History Narrative   • Not on file     Breast Cancer-related family history is not on file.      Review of Systems   Constitutional: Positive for malaise/fatigue. Negative for fever and weight loss.   Gastrointestinal: Positive for abdominal pain, nausea and vomiting.   Genitourinary: Negative.    Musculoskeletal: Positive for myalgias.   Neurological: Positive for headaches.          Objective:     /82 (BP Location: Left arm, Patient Position: Sitting, BP Cuff Size: Large adult)   Pulse 76   Temp 36.4 °C (97.5 °F) (Temporal)   Ht 1.829 m (6')   Wt 123.4 kg (272 lb)   SpO2 99%   BMI 36.89 kg/m²      Physical Exam   Constitutional: He is oriented to person, place, and time. He appears well-developed and well-nourished. No distress.   Cardiovascular: Normal rate, regular rhythm and normal heart sounds.   No murmur heard.  Pulmonary/Chest: Effort normal and breath sounds normal. No respiratory distress.   Abdominal: Soft. Bowel sounds are normal. There is tenderness in the right upper quadrant and epigastric area. There is no CVA tenderness.    Musculoskeletal: Normal range of motion.   Moves all 4 extremities normally   Neurological: He is alert and oriented to person, place, and time.   Skin: Skin is warm and dry.   Psychiatric: He has a normal mood and affect. His behavior is normal. Thought content normal.   Nursing note and vitals reviewed.              Assessment/Plan:     1. AGE (acute gastroenteritis)     2. Epigastric pain  Comp Metabolic Panel    CBC WITH DIFFERENTIAL    LIPASE    AMYLASE    US-RUQ     Work note for his acute illness yesterday.   Labs/ultrasound. Rule out gall bladder disease versus a possible pancreatitis. Will call with results.

## 2019-08-14 ENCOUNTER — OFFICE VISIT (OUTPATIENT)
Dept: URGENT CARE | Facility: PHYSICIAN GROUP | Age: 44
End: 2019-08-14
Payer: COMMERCIAL

## 2019-08-14 VITALS
HEART RATE: 74 BPM | WEIGHT: 274 LBS | BODY MASS INDEX: 37.11 KG/M2 | HEIGHT: 72 IN | RESPIRATION RATE: 16 BRPM | SYSTOLIC BLOOD PRESSURE: 132 MMHG | DIASTOLIC BLOOD PRESSURE: 88 MMHG | TEMPERATURE: 98.1 F | OXYGEN SATURATION: 98 %

## 2019-08-14 DIAGNOSIS — K04.7 DENTAL INFECTION: ICD-10-CM

## 2019-08-14 PROCEDURE — 99214 OFFICE O/P EST MOD 30 MIN: CPT | Mod: 25 | Performed by: PHYSICIAN ASSISTANT

## 2019-08-14 PROCEDURE — 96372 THER/PROPH/DIAG INJ SC/IM: CPT | Performed by: PHYSICIAN ASSISTANT

## 2019-08-14 RX ORDER — HYDROCODONE BITARTRATE AND ACETAMINOPHEN 5; 325 MG/1; MG/1
1-2 TABLET ORAL EVERY 6 HOURS PRN
Qty: 20 TAB | Refills: 0 | Status: SHIPPED | OUTPATIENT
Start: 2019-08-14 | End: 2019-08-21

## 2019-08-14 RX ORDER — AMOXICILLIN AND CLAVULANATE POTASSIUM 875; 125 MG/1; MG/1
1 TABLET, FILM COATED ORAL 2 TIMES DAILY
Qty: 20 TAB | Refills: 0 | Status: SHIPPED | OUTPATIENT
Start: 2019-08-14 | End: 2020-02-13

## 2019-08-14 ASSESSMENT — ENCOUNTER SYMPTOMS
BLURRED VISION: 0
VOMITING: 0
DIZZINESS: 0
CHILLS: 0
NAUSEA: 0
SHORTNESS OF BREATH: 1
FACIAL SWELLING: 1
COUGH: 0
FEVER: 0

## 2019-08-15 NOTE — PROGRESS NOTES
Subjective:   Vincent Mishra is a 44 y.o. male who presents for Facial Swelling (w/ tooth pain.  Can not get into the dentist until tomorrow.)         Facial Swelling    This is a new problem.  The current episode started in the past 7 days. Pertinent negatives include no chills, coughing, fever, nausea, rash or vomiting.     Patient comes clinic complaining of last 24 to 48 hours of dental pain and swelling on right upper dentition.  He notes a specific tooth of concern.  He has contacted his dentist who agrees to see him tomorrow.  He does have facial swelling extending up towards his eye and is concerned.  He denies nausea or vomiting.  He denies dizziness or visual changes.  He denies headache.  Notes subjective fevers chills over the course of the day.  He has been trying ibuprofen which has helped.    Review of Systems   Constitutional: Negative for chills and fever.   HENT: Positive for facial swelling.         Painful dentition right upper   Eyes: Negative for blurred vision.   Respiratory: Positive for shortness of breath. Negative for cough.    Gastrointestinal: Negative for nausea and vomiting.   Skin: Negative for rash.   Neurological: Negative for dizziness.     No Known Allergies   Objective:   /88   Pulse 74   Temp 36.7 °C (98.1 °F) (Temporal)   Resp 16   Ht 1.829 m (6')   Wt 124.3 kg (274 lb)   SpO2 98%   BMI 37.16 kg/m²    Physical Exam   Constitutional: He is oriented to person, place, and time. He appears well-developed and well-nourished. No distress.   HENT:   Head: Normocephalic and atraumatic. Head is without laceration.       Right Ear: External ear normal.   Left Ear: External ear normal.   Nose: Nose normal.   Mouth/Throat: Uvula is midline, oropharynx is clear and moist and mucous membranes are normal. Abnormal dentition. Dental caries present. No dental abscesses or uvula swelling.       Eyes: Conjunctivae are normal. Right eye exhibits no discharge. Left eye exhibits  no discharge. No scleral icterus.   Neck: Neck supple.   Pulmonary/Chest: Effort normal. No respiratory distress.   Musculoskeletal: Normal range of motion.   Neurological: He is alert and oriented to person, place, and time. Coordination normal.   Skin: Skin is warm and dry. He is not diaphoretic. No pallor.   Psychiatric: He has a normal mood and affect.   Nursing note and vitals reviewed.  Rocephin 1 g patient tolerates well      Assessment/Plan:   1. Dental infection  - cefTRIAXone (ROCEPHIN) 1 g, lidocaine (XYLOCAINE) 1 % 3.6 mL for IM use  - amoxicillin-clavulanate (AUGMENTIN) 875-125 MG Tab; Take 1 Tab by mouth 2 times a day.  Dispense: 20 Tab; Refill: 0  - HYDROcodone-acetaminophen (NORCO) 5-325 MG Tab per tablet; Take 1-2 Tabs by mouth every 6 hours as needed for up to 7 days.  Dispense: 20 Tab; Refill: 0    Other orders  - Consent for Opiate Prescription  Supportive care is reviewed with patient/caregiver - recommend to push PO fluids and electrolytes,  take full course of Rx, take with probiotics, observe for resolution  Return to clinic with lack of resolution or progression of symptoms.  Cautioned regarding potential for sedation with medication.  F/u w/ dentist tomorrow  ER precautions with any worsening symptoms are reviewed with patient/caregiver and they do express understanding    Differential diagnosis, natural history, supportive care, and indications for immediate follow-up discussed.

## 2020-02-13 ENCOUNTER — OFFICE VISIT (OUTPATIENT)
Dept: URGENT CARE | Facility: PHYSICIAN GROUP | Age: 45
End: 2020-02-13
Payer: COMMERCIAL

## 2020-02-13 VITALS
WEIGHT: 247.2 LBS | BODY MASS INDEX: 33.48 KG/M2 | HEART RATE: 87 BPM | TEMPERATURE: 98.7 F | RESPIRATION RATE: 18 BRPM | SYSTOLIC BLOOD PRESSURE: 110 MMHG | DIASTOLIC BLOOD PRESSURE: 88 MMHG | HEIGHT: 72 IN | OXYGEN SATURATION: 100 %

## 2020-02-13 DIAGNOSIS — R10.9 ABDOMINAL PAIN, UNSPECIFIED ABDOMINAL LOCATION: ICD-10-CM

## 2020-02-13 DIAGNOSIS — K52.9 ACUTE GASTROENTERITIS: ICD-10-CM

## 2020-02-13 DIAGNOSIS — R11.0 NAUSEA: ICD-10-CM

## 2020-02-13 LAB
APPEARANCE UR: NORMAL
BILIRUB UR STRIP-MCNC: NEGATIVE MG/DL
COLOR UR AUTO: NORMAL
GLUCOSE UR STRIP.AUTO-MCNC: NEGATIVE MG/DL
KETONES UR STRIP.AUTO-MCNC: NEGATIVE MG/DL
LEUKOCYTE ESTERASE UR QL STRIP.AUTO: NEGATIVE
NITRITE UR QL STRIP.AUTO: NEGATIVE
PH UR STRIP.AUTO: 5.5 [PH] (ref 5–8)
PROT UR QL STRIP: 30 MG/DL
RBC UR QL AUTO: NORMAL
SP GR UR STRIP.AUTO: 1.03
UROBILINOGEN UR STRIP-MCNC: 0.2 MG/DL

## 2020-02-13 PROCEDURE — 81002 URINALYSIS NONAUTO W/O SCOPE: CPT | Performed by: NURSE PRACTITIONER

## 2020-02-13 PROCEDURE — 99214 OFFICE O/P EST MOD 30 MIN: CPT | Performed by: NURSE PRACTITIONER

## 2020-02-13 RX ORDER — ONDANSETRON 4 MG/1
4 TABLET, ORALLY DISINTEGRATING ORAL EVERY 6 HOURS PRN
Qty: 10 TAB | Refills: 0 | Status: SHIPPED | OUTPATIENT
Start: 2020-02-13 | End: 2022-02-02

## 2020-02-13 ASSESSMENT — ENCOUNTER SYMPTOMS
CHILLS: 0
DIARRHEA: 1
ABDOMINAL PAIN: 1
NAUSEA: 1
DIZZINESS: 0
HEADACHES: 0
FEVER: 0
HEARTBURN: 0

## 2020-02-13 NOTE — LETTER
February 13, 2020         Patient: Vincent Mishra   YOB: 1975   Date of Visit: 2/13/2020           To Whom it May Concern:    Vincent Mishra was seen in my clinic on 2/13/2020. Please excuse him from work 02/13/2020 and 02/14/2020.    If you have any questions or concerns, please don't hesitate to call.        Sincerely,           GEORGINA Contreras.  Electronically Signed

## 2020-02-14 NOTE — PROGRESS NOTES
Subjective:      Vincent Mishra is a 45 y.o. male who presents with Abdominal Pain (diarrhea, fever, body ahces, nausea, headache, cough, tired  x1 day )            Abdominal Pain   This is a new problem. The current episode started yesterday. The onset quality is sudden. The problem occurs intermittently. The most recent episode lasted 1 day. The problem has been waxing and waning. The pain is located in the generalized abdominal region. The pain is at a severity of 4/10. The pain is moderate. The quality of the pain is aching. Associated symptoms include diarrhea and nausea. Pertinent negatives include no fever or headaches. Associated symptoms comments: Patient reports urgent care for new problem.  States that he started with the urge to vomit yesterday and developed watery yellow diarrhea. Denies bloody stool.  Denies fever, chills,or vomiting.  Has not taken anything over-the-counter for symptoms.  Is not sure if he ate bad chicken or has developed the stomach flu.  Needs a work note. Nothing aggravates the pain. The pain is relieved by nothing. He has tried nothing for the symptoms.       Review of Systems   Constitutional: Negative for chills, fever and malaise/fatigue.   Gastrointestinal: Positive for abdominal pain, diarrhea and nausea. Negative for heartburn.   Neurological: Negative for dizziness and headaches.     Past Medical History:   Diagnosis Date   • Asthma    • Bronchitis    • Chickenpox    • Nasal drainage    • Pneumonia    • Tonsillitis       Past Surgical History:   Procedure Laterality Date   • INGUINAL HERNIA REPAIR  9/11/2013    Performed by Adrián Caro M.D. at SURGERY SAME DAY [facility]   • INGUINAL HERNIA REPAIR  5/24/2010    Performed by LOY ESPINAL at SURGERY SAME DAY [facility]   • INGUINAL HERNIA REPAIR  2007    RIGHT   • APPENDECTOMY     • HERNIA REPAIR        Social History     Socioeconomic History   • Marital status:      Spouse name: Not on  file   • Number of children: Not on file   • Years of education: Not on file   • Highest education level: Not on file   Occupational History   • Not on file   Social Needs   • Financial resource strain: Not on file   • Food insecurity     Worry: Not on file     Inability: Not on file   • Transportation needs     Medical: Not on file     Non-medical: Not on file   Tobacco Use   • Smoking status: Never Smoker   • Smokeless tobacco: Never Used   • Tobacco comment: continue to avoid   Substance and Sexual Activity   • Alcohol use: No   • Drug use: No   • Sexual activity: Yes     Partners: Female   Lifestyle   • Physical activity     Days per week: Not on file     Minutes per session: Not on file   • Stress: Not on file   Relationships   • Social connections     Talks on phone: Not on file     Gets together: Not on file     Attends Caodaism service: Not on file     Active member of club or organization: Not on file     Attends meetings of clubs or organizations: Not on file     Relationship status: Not on file   • Intimate partner violence     Fear of current or ex partner: Not on file     Emotionally abused: Not on file     Physically abused: Not on file     Forced sexual activity: Not on file   Other Topics Concern   • Not on file   Social History Narrative   • Not on file    Patient has no known allergies.         Objective:     /88 (BP Location: Right arm, Patient Position: Sitting, BP Cuff Size: Adult)   Pulse 87   Temp 37.1 °C (98.7 °F) (Temporal)   Resp 18   Ht 1.829 m (6')   Wt 112.1 kg (247 lb 3.2 oz)   SpO2 100%   BMI 33.53 kg/m²      Physical Exam  Vitals signs reviewed.   Constitutional:       Appearance: Normal appearance.   Cardiovascular:      Rate and Rhythm: Normal rate and regular rhythm.      Heart sounds: Normal heart sounds.   Pulmonary:      Effort: Pulmonary effort is normal.      Breath sounds: Normal breath sounds.   Abdominal:      General: Abdomen is flat. Bowel sounds are normal.       Palpations: Abdomen is soft.      Tenderness: There is generalized abdominal tenderness.   Skin:     General: Skin is warm.   Neurological:      Mental Status: He is alert and oriented to person, place, and time.   Psychiatric:         Mood and Affect: Mood normal.         Behavior: Behavior normal.         Thought Content: Thought content normal.         Judgment: Judgment normal.             Assessment/Plan:       1. Abdominal pain, unspecified abdominal location  - POCT Urinalysis - Negative  - REFERRAL TO GASTROENTEROLOGY    2. Acute gastroenteritis  - REFERRAL TO GASTROENTEROLOGY    3. Nausea  - ondansetron (ZOFRAN ODT) 4 MG TABLET DISPERSIBLE; Take 1 Tab by mouth every 6 hours as needed for Nausea.  Dispense: 10 Tab; Refill: 0    Discussed physical examination findings are consistent with acute gastroenteritis however could also be due to food poisoning.  Discussed abortive care including increase fluids using electrolyte enriched beverages, eating nonirritating foods, avoiding use of antidiarrheals unless absolutely necessary and providing antiemetics.  Patient is also wondering if he is more sensitive due to radiation therapy for testicular cancer.  Would like follow-up with gastroenterology because he has been having more gastric upset than normal.  Will provide referral.    Supportive care, differential diagnoses, and indications for immediate follow-up discussed with patient.    Pathogenesis of diagnosis discussed including typical length and natural progression. Patient expresses understanding and agrees to plan.       Please note that this dictation was created using voice recognition software. I have made every reasonable attempt to correct obvious errors, but I expect that there are errors of grammar and possibly content that I did not discover before finalizing the note.

## 2020-03-19 ENCOUNTER — HOSPITAL ENCOUNTER (OUTPATIENT)
Dept: LAB | Facility: MEDICAL CENTER | Age: 45
End: 2020-03-19
Attending: PHYSICIAN ASSISTANT
Payer: COMMERCIAL

## 2020-03-19 LAB
ALBUMIN SERPL BCP-MCNC: 4.1 G/DL (ref 3.2–4.9)
ALBUMIN/GLOB SERPL: 1.4 G/DL
ALP SERPL-CCNC: 77 U/L (ref 30–99)
ALT SERPL-CCNC: 35 U/L (ref 2–50)
ANION GAP SERPL CALC-SCNC: 11 MMOL/L (ref 7–16)
AST SERPL-CCNC: 22 U/L (ref 12–45)
BASOPHILS # BLD AUTO: 0.5 % (ref 0–1.8)
BASOPHILS # BLD: 0.02 K/UL (ref 0–0.12)
BILIRUB SERPL-MCNC: 0.7 MG/DL (ref 0.1–1.5)
BUN SERPL-MCNC: 22 MG/DL (ref 8–22)
CALCIUM SERPL-MCNC: 8.9 MG/DL (ref 8.5–10.5)
CHLORIDE SERPL-SCNC: 103 MMOL/L (ref 96–112)
CO2 SERPL-SCNC: 26 MMOL/L (ref 20–33)
CREAT SERPL-MCNC: 0.89 MG/DL (ref 0.5–1.4)
EOSINOPHIL # BLD AUTO: 0.04 K/UL (ref 0–0.51)
EOSINOPHIL NFR BLD: 0.9 % (ref 0–6.9)
ERYTHROCYTE [DISTWIDTH] IN BLOOD BY AUTOMATED COUNT: 44.1 FL (ref 35.9–50)
FASTING STATUS PATIENT QL REPORTED: NORMAL
GLOBULIN SER CALC-MCNC: 3 G/DL (ref 1.9–3.5)
GLUCOSE SERPL-MCNC: 75 MG/DL (ref 65–99)
HCT VFR BLD AUTO: 46.2 % (ref 42–52)
HGB BLD-MCNC: 15.2 G/DL (ref 14–18)
IMM GRANULOCYTES # BLD AUTO: 0.01 K/UL (ref 0–0.11)
IMM GRANULOCYTES NFR BLD AUTO: 0.2 % (ref 0–0.9)
LYMPHOCYTES # BLD AUTO: 0.83 K/UL (ref 1–4.8)
LYMPHOCYTES NFR BLD: 19.2 % (ref 22–41)
MCH RBC QN AUTO: 29.1 PG (ref 27–33)
MCHC RBC AUTO-ENTMCNC: 32.9 G/DL (ref 33.7–35.3)
MCV RBC AUTO: 88.3 FL (ref 81.4–97.8)
MONOCYTES # BLD AUTO: 0.39 K/UL (ref 0–0.85)
MONOCYTES NFR BLD AUTO: 9 % (ref 0–13.4)
NEUTROPHILS # BLD AUTO: 3.03 K/UL (ref 1.82–7.42)
NEUTROPHILS NFR BLD: 70.2 % (ref 44–72)
NRBC # BLD AUTO: 0 K/UL
NRBC BLD-RTO: 0 /100 WBC
PLATELET # BLD AUTO: 236 K/UL (ref 164–446)
PMV BLD AUTO: 11.3 FL (ref 9–12.9)
POTASSIUM SERPL-SCNC: 4.5 MMOL/L (ref 3.6–5.5)
PROT SERPL-MCNC: 7.1 G/DL (ref 6–8.2)
RBC # BLD AUTO: 5.23 M/UL (ref 4.7–6.1)
SODIUM SERPL-SCNC: 140 MMOL/L (ref 135–145)
WBC # BLD AUTO: 4.3 K/UL (ref 4.8–10.8)

## 2020-03-19 PROCEDURE — 36415 COLL VENOUS BLD VENIPUNCTURE: CPT

## 2020-03-19 PROCEDURE — 80053 COMPREHEN METABOLIC PANEL: CPT

## 2020-03-19 PROCEDURE — 85025 COMPLETE CBC W/AUTO DIFF WBC: CPT

## 2020-11-06 ENCOUNTER — HOSPITAL ENCOUNTER (OUTPATIENT)
Facility: MEDICAL CENTER | Age: 45
End: 2020-11-06
Attending: PHYSICIAN ASSISTANT
Payer: COMMERCIAL

## 2020-11-06 ENCOUNTER — OFFICE VISIT (OUTPATIENT)
Dept: URGENT CARE | Facility: PHYSICIAN GROUP | Age: 45
End: 2020-11-06
Payer: COMMERCIAL

## 2020-11-06 VITALS
TEMPERATURE: 97.4 F | WEIGHT: 245 LBS | DIASTOLIC BLOOD PRESSURE: 86 MMHG | HEIGHT: 72 IN | RESPIRATION RATE: 18 BRPM | SYSTOLIC BLOOD PRESSURE: 118 MMHG | BODY MASS INDEX: 33.18 KG/M2 | HEART RATE: 66 BPM | OXYGEN SATURATION: 99 %

## 2020-11-06 DIAGNOSIS — Z20.822 EXPOSURE TO COVID-19 VIRUS: ICD-10-CM

## 2020-11-06 LAB — COVID ORDER STATUS COVID19: NORMAL

## 2020-11-06 PROCEDURE — U0003 INFECTIOUS AGENT DETECTION BY NUCLEIC ACID (DNA OR RNA); SEVERE ACUTE RESPIRATORY SYNDROME CORONAVIRUS 2 (SARS-COV-2) (CORONAVIRUS DISEASE [COVID-19]), AMPLIFIED PROBE TECHNIQUE, MAKING USE OF HIGH THROUGHPUT TECHNOLOGIES AS DESCRIBED BY CMS-2020-01-R: HCPCS

## 2020-11-06 PROCEDURE — 99213 OFFICE O/P EST LOW 20 MIN: CPT | Mod: CS | Performed by: PHYSICIAN ASSISTANT

## 2020-11-06 ASSESSMENT — ENCOUNTER SYMPTOMS
SORE THROAT: 0
SHORTNESS OF BREATH: 0
FEVER: 0
CHILLS: 0
HEMOPTYSIS: 0
WHEEZING: 0
PALPITATIONS: 0
COUGH: 1
SPUTUM PRODUCTION: 0

## 2020-11-06 ASSESSMENT — FIBROSIS 4 INDEX: FIB4 SCORE: 0.71

## 2020-11-06 NOTE — PROGRESS NOTES
Subjective:   Vincent Mishra is a 45 y.o. male who presents for Shortness of Breath (headche red eye and tiredness, 1 day )      Shortness of Breath  This is a new problem. The current episode started in the past 7 days. The problem occurs constantly. Pertinent negatives include no chest pain, ear pain, fever, hemoptysis, sore throat, sputum production or wheezing. Nothing aggravates the symptoms. The patient has no known risk factors for DVT/PE. He has tried OTC cough suppressants for the symptoms.       Review of Systems   Constitutional: Positive for malaise/fatigue. Negative for chills and fever.   HENT: Positive for congestion. Negative for ear pain and sore throat.    Respiratory: Positive for cough. Negative for hemoptysis, sputum production, shortness of breath and wheezing.    Cardiovascular: Negative for chest pain and palpitations.   All other systems reviewed and are negative.      Medications:    • cetirizine Tabs  • diphenhydrAMINE Tabs  • ondansetron Tbdp    Allergies: Patient has no known allergies.    Problem List: Vincent Mishra has Sleep apnea; Hyperlipidemia; Asthma; Major depressive disorder, single episode, moderate (HCC); and Obesity (BMI 35.0-39.9 without comorbidity) on their problem list.    Surgical History:  Past Surgical History:   Procedure Laterality Date   • INGUINAL HERNIA REPAIR  9/11/2013    Performed by Adrián Caro M.D. at SURGERY SAME DAY [facility]   • INGUINAL HERNIA REPAIR  5/24/2010    Performed by LOY ESPINAL at SURGERY SAME DAY [facility]   • INGUINAL HERNIA REPAIR  2007    RIGHT   • APPENDECTOMY     • HERNIA REPAIR         Past Social Hx: Vincent Mishra  reports that he has never smoked. He has never used smokeless tobacco. He reports that he does not drink alcohol or use drugs.     Past Family Hx:  Vincent Mishra family history includes Hyperlipidemia in his brother, father, paternal grandfather, and sister;  Hypertension in his father; Psychiatric Illness in his mother; Stroke in his maternal grandmother.     Problem list, medications, and allergies reviewed by myself today in Epic.     Objective:     Blood Pressure 118/86   Pulse 66   Temperature 36.3 °C (97.4 °F) (Temporal)   Respiration 18   Height 1.829 m (6')   Weight 111.1 kg (245 lb)   Oxygen Saturation 99%   Body Mass Index 33.23 kg/m²     Physical Exam  Vitals signs reviewed.   Constitutional:       General: He is not in acute distress.     Appearance: He is well-developed. He is not ill-appearing or toxic-appearing.      Interventions: He is not intubated.  HENT:      Head: Normocephalic and atraumatic.      Right Ear: Hearing, tympanic membrane, ear canal and external ear normal.      Left Ear: Hearing, tympanic membrane, ear canal and external ear normal.      Nose: Nose normal.      Mouth/Throat:      Pharynx: Uvula midline.   Eyes:      General: Lids are normal.      Conjunctiva/sclera: Conjunctivae normal.   Neck:      Musculoskeletal: Full passive range of motion without pain, normal range of motion and neck supple.   Cardiovascular:      Rate and Rhythm: Regular rhythm.      Heart sounds: Normal heart sounds, S1 normal and S2 normal. No murmur. No friction rub. No gallop.    Pulmonary:      Effort: Pulmonary effort is normal. No tachypnea, bradypnea, accessory muscle usage or respiratory distress. He is not intubated.      Breath sounds: Normal breath sounds. No decreased breath sounds, wheezing, rhonchi or rales.   Chest:      Chest wall: No tenderness.   Musculoskeletal: Normal range of motion.   Skin:     General: Skin is warm and dry.   Neurological:      Mental Status: He is alert and oriented to person, place, and time.   Psychiatric:         Speech: Speech normal.         Behavior: Behavior normal.         Thought Content: Thought content normal.         Judgment: Judgment normal.         Assessment/Plan:     Medical Decision  Making/Comments   Pt is a 45 yr old male who presents for evaluation of possible Covid-19 infection.  Pt states possible exposure. Complains of headache and cough.  Vitals and exam unremarkable.       Diagnosis and associated orders     1. Exposure to COVID-19 virus  COVID/SARS COV-2 PCR   - isolate for 24-72 hrs while pcr test is pending  - treat symptoms with OTC medications             Differential diagnosis, natural history, supportive care, and indications for immediate follow-up discussed.    Advised the patient to follow-up with the primary care physician for recheck, reevaluation, and consideration of further management.    Please note that this dictation was created using voice recognition software. I have made a reasonable attempt to correct obvious errors, but I expect that there are errors of grammar and possibly content that I did not discover before finalizing the note.

## 2020-11-07 LAB
SARS-COV-2 RNA RESP QL NAA+PROBE: NOTDETECTED
SPECIMEN SOURCE: NORMAL

## 2022-02-02 ENCOUNTER — APPOINTMENT (OUTPATIENT)
Dept: RADIOLOGY | Facility: IMAGING CENTER | Age: 47
End: 2022-02-02
Attending: FAMILY MEDICINE
Payer: COMMERCIAL

## 2022-02-02 ENCOUNTER — OFFICE VISIT (OUTPATIENT)
Dept: URGENT CARE | Facility: PHYSICIAN GROUP | Age: 47
End: 2022-02-02
Payer: COMMERCIAL

## 2022-02-02 VITALS
SYSTOLIC BLOOD PRESSURE: 132 MMHG | BODY MASS INDEX: 33.86 KG/M2 | DIASTOLIC BLOOD PRESSURE: 68 MMHG | OXYGEN SATURATION: 98 % | TEMPERATURE: 97 F | WEIGHT: 250 LBS | HEART RATE: 65 BPM | RESPIRATION RATE: 18 BRPM | HEIGHT: 72 IN

## 2022-02-02 DIAGNOSIS — U07.1 COVID-19: ICD-10-CM

## 2022-02-02 PROCEDURE — 71046 X-RAY EXAM CHEST 2 VIEWS: CPT | Mod: TC | Performed by: FAMILY MEDICINE

## 2022-02-02 PROCEDURE — 99214 OFFICE O/P EST MOD 30 MIN: CPT | Performed by: FAMILY MEDICINE

## 2022-02-02 RX ORDER — FLUTICASONE PROPIONATE 220 UG/1
2 AEROSOL, METERED RESPIRATORY (INHALATION) 2 TIMES DAILY
Qty: 12 G | Refills: 0 | Status: SHIPPED | OUTPATIENT
Start: 2022-02-02 | End: 2022-02-09

## 2022-02-02 RX ORDER — AZITHROMYCIN 250 MG/1
TABLET, FILM COATED ORAL
Qty: 6 TABLET | Refills: 0 | Status: SHIPPED | OUTPATIENT
Start: 2022-02-02 | End: 2023-03-09

## 2022-02-02 RX ORDER — BENZONATATE 200 MG/1
200 CAPSULE ORAL 3 TIMES DAILY PRN
Qty: 45 CAPSULE | Refills: 0 | Status: SHIPPED | OUTPATIENT
Start: 2022-02-02 | End: 2023-03-09

## 2022-02-02 ASSESSMENT — FIBROSIS 4 INDEX: FIB4 SCORE: 0.74

## 2022-02-02 NOTE — PROGRESS NOTES
CC:  cough        Cough  This is a new problem. The current episode started 3 wks ago.   Pt tested positive for COVID on 1/12      The problem has been unchanged. The problem occurs constantly. The cough is productive. Associated symptoms include : subj fever. Pertinent negatives include no   headaches, sinus pain, nasal congestion, nausea, vomiting, diarrhea, sweats, weight loss or wheezing. Nothing aggravates the symptoms.  Patient has tried nothing for the symptoms. There is no history of asthma.        Past Medical History:   Diagnosis Date   • Asthma    • Bronchitis    • Chickenpox    • Nasal drainage    • Pneumonia    • Tonsillitis          Social History     Tobacco Use   • Smoking status: Never Smoker   • Smokeless tobacco: Never Used   • Tobacco comment: continue to avoid   Substance Use Topics   • Alcohol use: No   • Drug use: No         Current Outpatient Medications on File Prior to Visit   Medication Sig Dispense Refill   • ondansetron (ZOFRAN ODT) 4 MG TABLET DISPERSIBLE Take 1 Tab by mouth every 6 hours as needed for Nausea. (Patient not taking: Reported on 11/6/2020) 10 Tab 0     No current facility-administered medications on file prior to visit.                    Review of Systems      HENT: negative for otalgia  Cardiovascular - denies chest pain or dyspnea  Respiratory: Positive for cough.  .  Negative for wheezing.    Neurological: Negative for headaches.   GI - denies nausea, vomiting or diarrhea  Neuro - denies numbness or tingling.            Objective:     /68 (BP Location: Right arm, Patient Position: Sitting, BP Cuff Size: Adult)   Pulse 65   Temp 36.1 °C (97 °F) (Temporal)   Resp 18   Ht 1.829 m (6')   Wt 113 kg (250 lb)   SpO2 98%     Physical Exam   Constitutional: patient is oriented to person, place, and time. Patient appears well-developed and well-nourished. No distress.   HENT:   Head: Normocephalic and atraumatic.   Right Ear: External ear normal.   Left Ear: External  ear normal.   Nose: Mucosal edema  present. Right sinus exhibits no maxillary sinus tenderness. Left sinus exhibits no maxillary sinus tenderness.   Mouth/Throat: Mucous membranes are normal. No oral lesions.  No posterior pharyngeal erythema.  No oropharyngeal exudate or posterior oropharyngeal edema.   Eyes: Conjunctivae and EOM are normal. Pupils are equal, round, and reactive to light. Right eye exhibits no discharge. Left eye exhibits no discharge. No scleral icterus.   Neck: Normal range of motion. Neck supple. No tracheal deviation present.   Cardiovascular: Normal rate, regular rhythm and normal heart sounds.  Exam reveals no friction rub.    Pulmonary/Chest: Effort normal. No respiratory distress. Patient has no wheezes or rhonchi. Patient has no rales.    Musculoskeletal:  exhibits no edema.   Lymphadenopathy:     Patient has no cervical adenopathy.      Neurological: patient is alert and oriented to person, place, and time.   Skin: Skin is warm and dry. No rash noted. No erythema.   Psychiatric: patient  has a normal mood and affect.  behavior is normal.   Nursing note and vitals reviewed.         Details    Reading Physician Reading Date Result Priority   Lex Orozco M.D.  [phone] 2/2/2022 [facility]     Narrative & Impression     2/2/2022 9:55 AM     HISTORY/REASON FOR EXAM:  cough; Cough  Cough x 2 weeks. Covid positive     TECHNIQUE/EXAM DESCRIPTION AND NUMBER OF VIEWS:  Two views of the chest.     COMPARISON:  8/29/2016.     FINDINGS:     No pulmonary infiltrates or consolidations are noted.  No pleural effusions, no pneumothorax are appreciated.  Normal cardiopericardial silhouette.     IMPRESSION:        1. No active cardiopulmonary abnormalities are identified.             Exam Ended: 02/02/22 10:03 AM Last Resulted: 02/02/22 10:05 AM                Assessment/Plan:       1. COVID-19  Chest x-ray was personally interpreted and reviewed. No acute cardiopulmonary findings. No pulmonary  infiltrates or densities. Cardiac silhouette is normal. No hemidiaphragm elevation. No bony abnormalities.    - fluticasone (FLOVENT HFA) 220 MCG/ACT Aerosol; Inhale 2 Puffs 2 times a day for 7 days.  Dispense: 12 g; Refill: 0  - benzonatate (TESSALON) 200 MG capsule; Take 1 Capsule by mouth 3 times a day as needed for Cough.  Dispense: 45 Capsule; Refill: 0  - azithromycin (ZITHROMAX) 250 MG Tab; Take as directed  Dispense: 6 Tablet; Refill: 0       Follow up in one week if no improvement, sooner if symptoms worsen.

## 2022-02-28 ENCOUNTER — TELEPHONE (OUTPATIENT)
Dept: SCHEDULING | Facility: IMAGING CENTER | Age: 47
End: 2022-02-28

## 2022-03-08 ENCOUNTER — APPOINTMENT (OUTPATIENT)
Dept: MEDICAL GROUP | Facility: MEDICAL CENTER | Age: 47
End: 2022-03-08
Payer: COMMERCIAL

## 2023-03-08 SDOH — ECONOMIC STABILITY: INCOME INSECURITY: IN THE LAST 12 MONTHS, WAS THERE A TIME WHEN YOU WERE NOT ABLE TO PAY THE MORTGAGE OR RENT ON TIME?: NO

## 2023-03-08 SDOH — ECONOMIC STABILITY: TRANSPORTATION INSECURITY
IN THE PAST 12 MONTHS, HAS THE LACK OF TRANSPORTATION KEPT YOU FROM MEDICAL APPOINTMENTS OR FROM GETTING MEDICATIONS?: NO

## 2023-03-08 SDOH — HEALTH STABILITY: PHYSICAL HEALTH: ON AVERAGE, HOW MANY DAYS PER WEEK DO YOU ENGAGE IN MODERATE TO STRENUOUS EXERCISE (LIKE A BRISK WALK)?: 2 DAYS

## 2023-03-08 SDOH — ECONOMIC STABILITY: HOUSING INSECURITY
IN THE LAST 12 MONTHS, WAS THERE A TIME WHEN YOU DID NOT HAVE A STEADY PLACE TO SLEEP OR SLEPT IN A SHELTER (INCLUDING NOW)?: NO

## 2023-03-08 SDOH — ECONOMIC STABILITY: HOUSING INSECURITY: IN THE LAST 12 MONTHS, HOW MANY PLACES HAVE YOU LIVED?: 1

## 2023-03-08 SDOH — ECONOMIC STABILITY: FOOD INSECURITY: WITHIN THE PAST 12 MONTHS, THE FOOD YOU BOUGHT JUST DIDN'T LAST AND YOU DIDN'T HAVE MONEY TO GET MORE.: NEVER TRUE

## 2023-03-08 SDOH — ECONOMIC STABILITY: FOOD INSECURITY: WITHIN THE PAST 12 MONTHS, YOU WORRIED THAT YOUR FOOD WOULD RUN OUT BEFORE YOU GOT MONEY TO BUY MORE.: NEVER TRUE

## 2023-03-08 SDOH — ECONOMIC STABILITY: TRANSPORTATION INSECURITY
IN THE PAST 12 MONTHS, HAS LACK OF TRANSPORTATION KEPT YOU FROM MEETINGS, WORK, OR FROM GETTING THINGS NEEDED FOR DAILY LIVING?: NO

## 2023-03-08 SDOH — HEALTH STABILITY: PHYSICAL HEALTH: ON AVERAGE, HOW MANY MINUTES DO YOU ENGAGE IN EXERCISE AT THIS LEVEL?: 20 MIN

## 2023-03-08 SDOH — ECONOMIC STABILITY: INCOME INSECURITY: HOW HARD IS IT FOR YOU TO PAY FOR THE VERY BASICS LIKE FOOD, HOUSING, MEDICAL CARE, AND HEATING?: NOT HARD AT ALL

## 2023-03-08 SDOH — ECONOMIC STABILITY: TRANSPORTATION INSECURITY
IN THE PAST 12 MONTHS, HAS LACK OF RELIABLE TRANSPORTATION KEPT YOU FROM MEDICAL APPOINTMENTS, MEETINGS, WORK OR FROM GETTING THINGS NEEDED FOR DAILY LIVING?: NO

## 2023-03-08 SDOH — HEALTH STABILITY: MENTAL HEALTH
STRESS IS WHEN SOMEONE FEELS TENSE, NERVOUS, ANXIOUS, OR CAN'T SLEEP AT NIGHT BECAUSE THEIR MIND IS TROUBLED. HOW STRESSED ARE YOU?: TO SOME EXTENT

## 2023-03-08 ASSESSMENT — SOCIAL DETERMINANTS OF HEALTH (SDOH)
DO YOU BELONG TO ANY CLUBS OR ORGANIZATIONS SUCH AS CHURCH GROUPS UNIONS, FRATERNAL OR ATHLETIC GROUPS, OR SCHOOL GROUPS?: YES
HOW OFTEN DO YOU ATTENT MEETINGS OF THE CLUB OR ORGANIZATION YOU BELONG TO?: MORE THAN 4 TIMES PER YEAR
IN A TYPICAL WEEK, HOW MANY TIMES DO YOU TALK ON THE PHONE WITH FAMILY, FRIENDS, OR NEIGHBORS?: ONCE A WEEK
HOW OFTEN DO YOU HAVE A DRINK CONTAINING ALCOHOL: NEVER
HOW OFTEN DO YOU HAVE SIX OR MORE DRINKS ON ONE OCCASION: NEVER
DO YOU BELONG TO ANY CLUBS OR ORGANIZATIONS SUCH AS CHURCH GROUPS UNIONS, FRATERNAL OR ATHLETIC GROUPS, OR SCHOOL GROUPS?: YES
HOW HARD IS IT FOR YOU TO PAY FOR THE VERY BASICS LIKE FOOD, HOUSING, MEDICAL CARE, AND HEATING?: NOT HARD AT ALL
WITHIN THE PAST 12 MONTHS, YOU WORRIED THAT YOUR FOOD WOULD RUN OUT BEFORE YOU GOT THE MONEY TO BUY MORE: NEVER TRUE
HOW OFTEN DO YOU ATTENT MEETINGS OF THE CLUB OR ORGANIZATION YOU BELONG TO?: MORE THAN 4 TIMES PER YEAR
HOW OFTEN DO YOU ATTEND CHURCH OR RELIGIOUS SERVICES?: MORE THAN 4 TIMES PER YEAR
HOW OFTEN DO YOU GET TOGETHER WITH FRIENDS OR RELATIVES?: ONCE A WEEK
HOW OFTEN DO YOU GET TOGETHER WITH FRIENDS OR RELATIVES?: ONCE A WEEK
HOW OFTEN DO YOU ATTEND CHURCH OR RELIGIOUS SERVICES?: MORE THAN 4 TIMES PER YEAR
HOW MANY DRINKS CONTAINING ALCOHOL DO YOU HAVE ON A TYPICAL DAY WHEN YOU ARE DRINKING: PATIENT DOES NOT DRINK
IN A TYPICAL WEEK, HOW MANY TIMES DO YOU TALK ON THE PHONE WITH FAMILY, FRIENDS, OR NEIGHBORS?: ONCE A WEEK

## 2023-03-08 ASSESSMENT — LIFESTYLE VARIABLES
HOW OFTEN DO YOU HAVE SIX OR MORE DRINKS ON ONE OCCASION: NEVER
HOW OFTEN DO YOU HAVE A DRINK CONTAINING ALCOHOL: NEVER
SKIP TO QUESTIONS 9-10: 1
AUDIT-C TOTAL SCORE: 0
HOW MANY STANDARD DRINKS CONTAINING ALCOHOL DO YOU HAVE ON A TYPICAL DAY: PATIENT DOES NOT DRINK

## 2023-03-09 ENCOUNTER — OFFICE VISIT (OUTPATIENT)
Dept: MEDICAL GROUP | Facility: PHYSICIAN GROUP | Age: 48
End: 2023-03-09
Payer: COMMERCIAL

## 2023-03-09 VITALS
SYSTOLIC BLOOD PRESSURE: 132 MMHG | HEART RATE: 88 BPM | DIASTOLIC BLOOD PRESSURE: 84 MMHG | TEMPERATURE: 96.9 F | WEIGHT: 289 LBS | BODY MASS INDEX: 39.14 KG/M2 | OXYGEN SATURATION: 98 % | HEIGHT: 72 IN | RESPIRATION RATE: 14 BRPM

## 2023-03-09 DIAGNOSIS — R53.1 WEAKNESS GENERALIZED: ICD-10-CM

## 2023-03-09 DIAGNOSIS — E66.9 OBESITY (BMI 30-39.9): ICD-10-CM

## 2023-03-09 DIAGNOSIS — R53.82 CHRONIC FATIGUE: ICD-10-CM

## 2023-03-09 DIAGNOSIS — Z00.00 PREVENTATIVE HEALTH CARE: ICD-10-CM

## 2023-03-09 DIAGNOSIS — R03.0 ELEVATED BLOOD PRESSURE READING WITHOUT DIAGNOSIS OF HYPERTENSION: ICD-10-CM

## 2023-03-09 DIAGNOSIS — Z85.47 HISTORY OF TESTICULAR CANCER: ICD-10-CM

## 2023-03-09 DIAGNOSIS — Z12.11 COLON CANCER SCREENING: ICD-10-CM

## 2023-03-09 DIAGNOSIS — E78.2 MIXED HYPERLIPIDEMIA: ICD-10-CM

## 2023-03-09 PROBLEM — F32.1 MAJOR DEPRESSIVE DISORDER, SINGLE EPISODE, MODERATE (HCC): Status: RESOLVED | Noted: 2017-06-30 | Resolved: 2023-03-09

## 2023-03-09 PROCEDURE — 99214 OFFICE O/P EST MOD 30 MIN: CPT | Performed by: FAMILY MEDICINE

## 2023-03-09 ASSESSMENT — PATIENT HEALTH QUESTIONNAIRE - PHQ9
1. LITTLE INTEREST OR PLEASURE IN DOING THINGS: NOT AT ALL
SUM OF ALL RESPONSES TO PHQ9 QUESTIONS 1 AND 2: 0
2. FEELING DOWN, DEPRESSED, IRRITABLE, OR HOPELESS: NOT AT ALL
7. TROUBLE CONCENTRATING ON THINGS, SUCH AS READING THE NEWSPAPER OR WATCHING TELEVISION: NOT AT ALL
6. FEELING BAD ABOUT YOURSELF - OR THAT YOU ARE A FAILURE OR HAVE LET YOURSELF OR YOUR FAMILY DOWN: NOT AL ALL
8. MOVING OR SPEAKING SO SLOWLY THAT OTHER PEOPLE COULD HAVE NOTICED. OR THE OPPOSITE, BEING SO FIGETY OR RESTLESS THAT YOU HAVE BEEN MOVING AROUND A LOT MORE THAN USUAL: NOT AT ALL
SUM OF ALL RESPONSES TO PHQ QUESTIONS 1-9: 0
5. POOR APPETITE OR OVEREATING: NOT AT ALL
4. FEELING TIRED OR HAVING LITTLE ENERGY: NOT AT ALL
9. THOUGHTS THAT YOU WOULD BE BETTER OFF DEAD, OR OF HURTING YOURSELF: NOT AT ALL
3. TROUBLE FALLING OR STAYING ASLEEP OR SLEEPING TOO MUCH: NOT AT ALL

## 2023-03-09 NOTE — PROGRESS NOTES
"CHIEF COMPLAINT / REASON FOR VISIT  Vincent Mishra is a 48 y.o. male that presents today to establish care.    HISTORY OF PRESENT ILLNESS  Has been more tired recently. 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 Feels generally weak (doesn't want to pick his arms up) and lethargic. Feels like \"arms don't belong to me\". Feels like he has some numbness in his right upper extremity extending from right shoulder to hand. The weakness is worse on right upper extremity.    Ready to quit job. Feels like he is fed up with working, feels burned out always working. No SI/HI. Denies feelings of depression.     Denies unintentional weight loss, numbness/paresthesias. Vision is a little more blurry than usual (hasn't seen eye doctor recently). Denies any new balance difficulties. Denies any physical trauma. Endorses lower extremity edema.     Right lateral upper arm nodule, tender, 2 cm, noticed in past couple months. Reports other similar lumps    Past Medical History  Generalized anxiety disorder - previously on Prozac, resolved now (reports that the anxiety was more situational. Prozac was more \"numbing\" and made him feel like a \"zombie\")  Hyperlipidemia  Obesity  Testicular cancer - s/p unilateral orchiectomy (Left) and radiation 2018    Past Surgical History:   Procedure Laterality Date    INGUINAL HERNIA REPAIR  9/11/2013    Performed by Adrián Caro M.D. at SURGERY SAME DAY [facility]    INGUINAL HERNIA REPAIR  5/24/2010    Performed by LOY ESPINAL at SURGERY SAME DAY [facility]    INGUINAL HERNIA REPAIR  2007    RIGHT    APPENDECTOMY      HERNIA REPAIR       Social History     Tobacco Use    Smoking status: Never    Smokeless tobacco: Never    Tobacco comments:     continue to avoid   Substance Use Topics    Alcohol use: No    Drug use: No     Family History  Father - obesity, DM, stroke (60s)  Mom - " obesity    OBJECTIVE    /84 (BP Location: Left arm, Patient Position: Sitting, BP Cuff Size: Adult)   Pulse 88   Temp 36.1 °C (96.9 °F) (Temporal)   Resp 14   Ht 1.829 m (6')   Wt (!) 131 kg (289 lb)   SpO2 98%   BMI 39.20 kg/m²  Body mass index is 39.2 kg/m².    PHYSICAL EXAM  Constitutional: Sitting comfortably, in no acute distress, responds to questions appropriately.  Head: Normocephalic  Eyes:  No conjunctival injection, no scleral icterus, PERRL  Ears: External ear canals clear, TMs pearly grey with visualized bony landmarks and crisp light reflex  Mouth: Oral mucosa moist. Good dentition  Throat: Oropharynx clear without erythema or tonsillar exudates  Neck: No cervical lymphadenopathy  Heart: Regular S1 S2, no murmurs, rub, or gallops  Lungs: Clear to auscultation bilaterally, no wheezes, rales, or rhonchi  Extremities: No lower extremity edema. 2+ symmetric radial pulses  Skin: Warm and dry, no rashes or lesions on face or exposed upper extremities  Neurologic: Cranial nerves II-XII intact. No dysdiadochokinesia with rapid alternating movements. No dysmetria with heel-to-shin or finger-to-nose testing. Romberg negative. Negative for pronator drift. Normal gait. Patellar, biceps, triceps, and Achilles tendon reflexes 2+ bilaterally. 5/5 strength in upper and lower extremities.    ASSESSMENT & PLAN  1. Chronic fatigue  While this may just be related to his chronic sleep deprivation (working night shifts), will obtain further lab work-up to rule out other etiologies. If no obvious etiologies would work up for possible MIGUEL  - THYROID CASCADE PROFILE; Future  - FERRITIN; Future  - PHOSPHORUS; Future  - MAGNESIUM; Future  - VITAMIN D,25 HYDROXY (DEFICIENCY); Future  - VIT B12,  FOLIC ACID  - CRP QUANTITIVE (NON-CARDIAC); Future  - Comp Metabolic Panel; Future  - CBC WITH DIFFERENTIAL; Future  - Testosterone, Free & Total, Adult Male (w/SHBG); Future  - IRON/TOTAL IRON BIND; Future    2. Weakness  generalized  Reports generalized weakness, right greater than left, he is actually very strong on physical exam with symmetric strength and reflexes.  Normal neurologic exam.  Obtain labs for further work-up.  - THYROID CASCADE PROFILE; Future  - FERRITIN; Future  - PHOSPHORUS; Future  - MAGNESIUM; Future  - CREATINE KINASE; Future  - ALDOLASE; Future  - IRON/TOTAL IRON BIND; Future    3. Elevated blood pressure reading without diagnosis of hypertension  Plan to recheck blood pressure at next visit.  Goal less than 130/80    4. Mixed hyperlipidemia  Due for repeat labs, goal LDL-C less than 100, goal apo B less than 90  - Lipid Profile; Future  - APOLIPOPROTEIN B; Future    5. Preventative health care  - Lipid Profile; Future  - HEMOGLOBIN A1C; Future  - APOLIPOPROTEIN B; Future    6. Obesity (BMI 30-39.9)  - THYROID CASCADE PROFILE; Future  - Lipid Profile; Future  - HEMOGLOBIN A1C; Future    7. Colon cancer screening  - Referral to GI for Colonoscopy    8. History of testicular cancer  Testicular cancer - s/p unilateral orchiectomy and radiation 2018. He reports that he was supposed to have some sort of follow-up after his orchiectomy and radiation, however he did not do this.  He is not sure what the type of testicular cancer was, but when I mention seminoma he said that sounded familiar.  - LDH; Future  - HCG QUANTITATIVE; Future  - AFP SERUM TUMOR MARKER; Future    Follow-up after labs

## 2023-03-14 ENCOUNTER — HOSPITAL ENCOUNTER (OUTPATIENT)
Dept: LAB | Facility: MEDICAL CENTER | Age: 48
End: 2023-03-14
Attending: FAMILY MEDICINE
Payer: COMMERCIAL

## 2023-03-14 DIAGNOSIS — E78.2 MIXED HYPERLIPIDEMIA: ICD-10-CM

## 2023-03-14 DIAGNOSIS — E66.9 OBESITY (BMI 30-39.9): ICD-10-CM

## 2023-03-14 DIAGNOSIS — R53.1 WEAKNESS GENERALIZED: ICD-10-CM

## 2023-03-14 DIAGNOSIS — R53.82 CHRONIC FATIGUE: ICD-10-CM

## 2023-03-14 DIAGNOSIS — Z85.47 HISTORY OF TESTICULAR CANCER: ICD-10-CM

## 2023-03-14 DIAGNOSIS — Z00.00 PREVENTATIVE HEALTH CARE: ICD-10-CM

## 2023-03-14 LAB
25(OH)D3 SERPL-MCNC: 15 NG/ML (ref 30–100)
ALBUMIN SERPL BCP-MCNC: 4.4 G/DL (ref 3.2–4.9)
ALBUMIN/GLOB SERPL: 1.4 G/DL
ALP SERPL-CCNC: 97 U/L (ref 30–99)
ALT SERPL-CCNC: 28 U/L (ref 2–50)
ANION GAP SERPL CALC-SCNC: 12 MMOL/L (ref 7–16)
AST SERPL-CCNC: 22 U/L (ref 12–45)
B-HCG SERPL-ACNC: <1 MIU/ML (ref 0–5)
BASOPHILS # BLD AUTO: 0.4 % (ref 0–1.8)
BASOPHILS # BLD: 0.03 K/UL (ref 0–0.12)
BILIRUB SERPL-MCNC: 0.3 MG/DL (ref 0.1–1.5)
BUN SERPL-MCNC: 23 MG/DL (ref 8–22)
CALCIUM ALBUM COR SERPL-MCNC: 9.1 MG/DL (ref 8.5–10.5)
CALCIUM SERPL-MCNC: 9.4 MG/DL (ref 8.5–10.5)
CHLORIDE SERPL-SCNC: 106 MMOL/L (ref 96–112)
CHOLEST SERPL-MCNC: 174 MG/DL (ref 100–199)
CK SERPL-CCNC: 128 U/L (ref 0–154)
CO2 SERPL-SCNC: 25 MMOL/L (ref 20–33)
CREAT SERPL-MCNC: 1.02 MG/DL (ref 0.5–1.4)
CRP SERPL HS-MCNC: 0.69 MG/DL (ref 0–0.75)
EOSINOPHIL # BLD AUTO: 0.06 K/UL (ref 0–0.51)
EOSINOPHIL NFR BLD: 0.9 % (ref 0–6.9)
ERYTHROCYTE [DISTWIDTH] IN BLOOD BY AUTOMATED COUNT: 45.5 FL (ref 35.9–50)
EST. AVERAGE GLUCOSE BLD GHB EST-MCNC: 105 MG/DL
FERRITIN SERPL-MCNC: 260 NG/ML (ref 22–322)
FOLATE SERPL-MCNC: 5.1 NG/ML
GFR SERPLBLD CREATININE-BSD FMLA CKD-EPI: 91 ML/MIN/1.73 M 2
GLOBULIN SER CALC-MCNC: 3.2 G/DL (ref 1.9–3.5)
GLUCOSE SERPL-MCNC: 93 MG/DL (ref 65–99)
HBA1C MFR BLD: 5.3 % (ref 4–5.6)
HCT VFR BLD AUTO: 47.7 % (ref 42–52)
HDLC SERPL-MCNC: 29 MG/DL
HGB BLD-MCNC: 15.5 G/DL (ref 14–18)
IMM GRANULOCYTES # BLD AUTO: 0.02 K/UL (ref 0–0.11)
IMM GRANULOCYTES NFR BLD AUTO: 0.3 % (ref 0–0.9)
IRON SATN MFR SERPL: 20 % (ref 15–55)
IRON SERPL-MCNC: 54 UG/DL (ref 50–180)
LDH SERPL L TO P-CCNC: 235 U/L (ref 107–266)
LDLC SERPL CALC-MCNC: 99 MG/DL
LYMPHOCYTES # BLD AUTO: 1.49 K/UL (ref 1–4.8)
LYMPHOCYTES NFR BLD: 22 % (ref 22–41)
MAGNESIUM SERPL-MCNC: 2 MG/DL (ref 1.5–2.5)
MCH RBC QN AUTO: 29 PG (ref 27–33)
MCHC RBC AUTO-ENTMCNC: 32.5 G/DL (ref 33.7–35.3)
MCV RBC AUTO: 89.2 FL (ref 81.4–97.8)
MONOCYTES # BLD AUTO: 0.53 K/UL (ref 0–0.85)
MONOCYTES NFR BLD AUTO: 7.8 % (ref 0–13.4)
NEUTROPHILS # BLD AUTO: 4.63 K/UL (ref 1.82–7.42)
NEUTROPHILS NFR BLD: 68.6 % (ref 44–72)
NRBC # BLD AUTO: 0 K/UL
NRBC BLD-RTO: 0 /100 WBC
PHOSPHATE SERPL-MCNC: 3.8 MG/DL (ref 2.5–4.5)
PLATELET # BLD AUTO: 246 K/UL (ref 164–446)
PMV BLD AUTO: 11.1 FL (ref 9–12.9)
POTASSIUM SERPL-SCNC: 4.5 MMOL/L (ref 3.6–5.5)
PROT SERPL-MCNC: 7.6 G/DL (ref 6–8.2)
RBC # BLD AUTO: 5.35 M/UL (ref 4.7–6.1)
SODIUM SERPL-SCNC: 143 MMOL/L (ref 135–145)
TIBC SERPL-MCNC: 272 UG/DL (ref 250–450)
TRIGL SERPL-MCNC: 228 MG/DL (ref 0–149)
TSH SERPL DL<=0.005 MIU/L-ACNC: 2.66 UIU/ML (ref 0.38–5.33)
UIBC SERPL-MCNC: 218 UG/DL (ref 110–370)
VIT B12 SERPL-MCNC: 506 PG/ML (ref 211–911)
WBC # BLD AUTO: 6.8 K/UL (ref 4.8–10.8)

## 2023-03-14 PROCEDURE — 84402 ASSAY OF FREE TESTOSTERONE: CPT

## 2023-03-14 PROCEDURE — 83550 IRON BINDING TEST: CPT

## 2023-03-14 PROCEDURE — 80061 LIPID PANEL: CPT

## 2023-03-14 PROCEDURE — 83735 ASSAY OF MAGNESIUM: CPT

## 2023-03-14 PROCEDURE — 82550 ASSAY OF CK (CPK): CPT

## 2023-03-14 PROCEDURE — 82085 ASSAY OF ALDOLASE: CPT

## 2023-03-14 PROCEDURE — 82172 ASSAY OF APOLIPOPROTEIN: CPT

## 2023-03-14 PROCEDURE — 83540 ASSAY OF IRON: CPT

## 2023-03-14 PROCEDURE — 84443 ASSAY THYROID STIM HORMONE: CPT

## 2023-03-14 PROCEDURE — 84100 ASSAY OF PHOSPHORUS: CPT

## 2023-03-14 PROCEDURE — 82746 ASSAY OF FOLIC ACID SERUM: CPT

## 2023-03-14 PROCEDURE — 82306 VITAMIN D 25 HYDROXY: CPT

## 2023-03-14 PROCEDURE — 82728 ASSAY OF FERRITIN: CPT

## 2023-03-14 PROCEDURE — 83615 LACTATE (LD) (LDH) ENZYME: CPT

## 2023-03-14 PROCEDURE — 84270 ASSAY OF SEX HORMONE GLOBUL: CPT

## 2023-03-14 PROCEDURE — 80053 COMPREHEN METABOLIC PANEL: CPT

## 2023-03-14 PROCEDURE — 83036 HEMOGLOBIN GLYCOSYLATED A1C: CPT

## 2023-03-14 PROCEDURE — 84702 CHORIONIC GONADOTROPIN TEST: CPT

## 2023-03-14 PROCEDURE — 84403 ASSAY OF TOTAL TESTOSTERONE: CPT

## 2023-03-14 PROCEDURE — 82607 VITAMIN B-12: CPT

## 2023-03-14 PROCEDURE — 86140 C-REACTIVE PROTEIN: CPT

## 2023-03-14 PROCEDURE — 36415 COLL VENOUS BLD VENIPUNCTURE: CPT

## 2023-03-14 PROCEDURE — 82105 ALPHA-FETOPROTEIN SERUM: CPT

## 2023-03-14 PROCEDURE — 85025 COMPLETE CBC W/AUTO DIFF WBC: CPT

## 2023-03-15 LAB
AFP-TM SERPL-MCNC: 2 NG/ML (ref 0–9)
ALDOLASE SERPL-CCNC: 5.3 U/L (ref 1.2–7.6)
APO B100 SERPL-MCNC: 96 MG/DL (ref 55–140)

## 2023-03-16 DIAGNOSIS — R79.89 LOW TESTOSTERONE IN MALE: ICD-10-CM

## 2023-03-16 LAB
SHBG SERPL-SCNC: 30 NMOL/L (ref 17–56)
TESTOST FREE MFR SERPL: 1.8 % (ref 1.6–2.9)
TESTOST FREE SERPL-MCNC: 40 PG/ML (ref 47–244)
TESTOST SERPL-MCNC: 218 NG/DL (ref 300–890)

## 2023-03-17 ENCOUNTER — HOSPITAL ENCOUNTER (OUTPATIENT)
Dept: LAB | Facility: MEDICAL CENTER | Age: 48
End: 2023-03-17
Attending: FAMILY MEDICINE
Payer: COMMERCIAL

## 2023-03-17 DIAGNOSIS — R79.89 LOW TESTOSTERONE IN MALE: ICD-10-CM

## 2023-03-17 LAB — PROLACTIN SERPL-MCNC: 8.45 NG/ML (ref 2.1–17.7)

## 2023-03-17 PROCEDURE — 84270 ASSAY OF SEX HORMONE GLOBUL: CPT

## 2023-03-17 PROCEDURE — 84403 ASSAY OF TOTAL TESTOSTERONE: CPT

## 2023-03-17 PROCEDURE — 84402 ASSAY OF FREE TESTOSTERONE: CPT

## 2023-03-17 PROCEDURE — 84146 ASSAY OF PROLACTIN: CPT

## 2023-03-17 PROCEDURE — 36415 COLL VENOUS BLD VENIPUNCTURE: CPT

## 2023-03-19 LAB
SHBG SERPL-SCNC: 30 NMOL/L (ref 17–56)
TESTOST FREE MFR SERPL: 1.8 % (ref 1.6–2.9)
TESTOST FREE SERPL-MCNC: 39 PG/ML (ref 47–244)
TESTOST SERPL-MCNC: 210 NG/DL (ref 300–890)

## 2023-03-24 ENCOUNTER — OFFICE VISIT (OUTPATIENT)
Dept: MEDICAL GROUP | Facility: PHYSICIAN GROUP | Age: 48
End: 2023-03-24
Payer: COMMERCIAL

## 2023-03-24 ENCOUNTER — HOSPITAL ENCOUNTER (OUTPATIENT)
Dept: LAB | Facility: MEDICAL CENTER | Age: 48
End: 2023-03-24
Attending: INTERNAL MEDICINE
Payer: COMMERCIAL

## 2023-03-24 VITALS
WEIGHT: 289 LBS | HEIGHT: 72 IN | OXYGEN SATURATION: 98 % | SYSTOLIC BLOOD PRESSURE: 120 MMHG | HEART RATE: 68 BPM | TEMPERATURE: 97 F | BODY MASS INDEX: 39.14 KG/M2 | DIASTOLIC BLOOD PRESSURE: 78 MMHG

## 2023-03-24 DIAGNOSIS — E29.1 HYPOGONADISM MALE: ICD-10-CM

## 2023-03-24 DIAGNOSIS — Z23 NEED FOR VACCINATION: ICD-10-CM

## 2023-03-24 DIAGNOSIS — R79.89 LOW TESTOSTERONE IN MALE: ICD-10-CM

## 2023-03-24 DIAGNOSIS — Z85.47 HISTORY OF TESTICULAR CANCER: ICD-10-CM

## 2023-03-24 PROCEDURE — 36415 COLL VENOUS BLD VENIPUNCTURE: CPT

## 2023-03-24 PROCEDURE — 83002 ASSAY OF GONADOTROPIN (LH): CPT

## 2023-03-24 PROCEDURE — 99214 OFFICE O/P EST MOD 30 MIN: CPT | Mod: 25 | Performed by: FAMILY MEDICINE

## 2023-03-24 PROCEDURE — 90471 IMMUNIZATION ADMIN: CPT | Performed by: FAMILY MEDICINE

## 2023-03-24 PROCEDURE — 83001 ASSAY OF GONADOTROPIN (FSH): CPT

## 2023-03-24 PROCEDURE — 90715 TDAP VACCINE 7 YRS/> IM: CPT | Performed by: FAMILY MEDICINE

## 2023-03-24 ASSESSMENT — FIBROSIS 4 INDEX: FIB4 SCORE: 0.81

## 2023-03-24 NOTE — PROGRESS NOTES
CHIEF COMPLAINT / REASON FOR VISIT  Vincent Mishra is a 48 y.o. male that presents today for f/u labs    HISTORY OF PRESENT ILLNESS  Labs significant for:  Low total testosterone 218, with repeat 210.  Prolactin normal.  Vitamin D 15  Apo B 96, triglycerides 228    Symptoms include generalized fatigue and decreased motivation    OBJECTIVE     /78 (BP Location: Left arm, Patient Position: Sitting, BP Cuff Size: Adult)   Pulse 68   Temp 36.1 °C (97 °F) (Temporal)   Ht 1.829 m (6')   Wt (!) 131 kg (289 lb) Comment: patient reported  SpO2 98%   BMI 39.20 kg/m²  Body mass index is 39.2 kg/m².    PHYSICAL EXAM  Constitutional: Sitting comfortably, in no acute distress, responds to questions appropriately.  Skin: Warm and dry, no rashes or lesions on face or exposed upper extremities    ASSESSMENT & PLAN  1. Low testosterone in male  2. Hypogonadism male  3. History of testicular cancer  Suspect possible primary hypogonadism secondary to unilateral orchiectomy (due to testicular cancer).  Prolactin is normal.  We will obtain FSH/LH to confirm primary hypergonadism.  However if FSH/LH are low then we would consider brain MRI.  We will treat his low testosterone with testosterone replacement, starting at testosterone patch 2 mg daily.  We will plan recheck of testosterone levels in 2 to 4 weeks, goal total testosterone level greater than 300  - Testosterone 2 MG/24HR PATCH 24 HR; Place 1 Patch on the skin every day for 60 days. Indications: Deficient Activity of the Testis  Dispense: 30 Patch; Refill: 3  - FSH/LH; Future    4. Need for vaccination  - Tdap =>6yo IM

## 2023-03-25 LAB
FSH SERPL-ACNC: 13.9 MIU/ML (ref 1.5–12.4)
LH SERPL-ACNC: 6.9 IU/L (ref 1.7–8.6)

## 2023-04-08 ENCOUNTER — PATIENT MESSAGE (OUTPATIENT)
Dept: MEDICAL GROUP | Facility: PHYSICIAN GROUP | Age: 48
End: 2023-04-08
Payer: COMMERCIAL

## 2023-04-08 DIAGNOSIS — R79.89 LOW TESTOSTERONE IN MALE: ICD-10-CM

## 2023-04-08 DIAGNOSIS — E29.1 HYPOGONADISM MALE: ICD-10-CM

## 2023-04-24 ENCOUNTER — PATIENT MESSAGE (OUTPATIENT)
Dept: MEDICAL GROUP | Facility: PHYSICIAN GROUP | Age: 48
End: 2023-04-24
Payer: COMMERCIAL

## 2023-04-24 DIAGNOSIS — E29.1 HYPOGONADISM MALE: ICD-10-CM

## 2023-04-24 DIAGNOSIS — R79.89 LOW TESTOSTERONE IN MALE: ICD-10-CM

## 2023-04-25 RX ORDER — TESTOSTERONE CYPIONATE 200 MG/ML
100 INJECTION, SOLUTION INTRAMUSCULAR
Qty: 6 ML | Refills: 0 | Status: SHIPPED | OUTPATIENT
Start: 2023-04-25 | End: 2023-05-19

## 2023-04-28 ENCOUNTER — OFFICE VISIT (OUTPATIENT)
Dept: MEDICAL GROUP | Facility: PHYSICIAN GROUP | Age: 48
End: 2023-04-28
Payer: COMMERCIAL

## 2023-04-28 VITALS
HEART RATE: 80 BPM | OXYGEN SATURATION: 97 % | WEIGHT: 283.9 LBS | TEMPERATURE: 98.4 F | BODY MASS INDEX: 38.45 KG/M2 | HEIGHT: 72 IN | DIASTOLIC BLOOD PRESSURE: 102 MMHG | SYSTOLIC BLOOD PRESSURE: 142 MMHG

## 2023-04-28 DIAGNOSIS — R03.0 ELEVATED BLOOD PRESSURE READING WITHOUT DIAGNOSIS OF HYPERTENSION: ICD-10-CM

## 2023-04-28 DIAGNOSIS — Z23 NEED FOR VACCINATION: ICD-10-CM

## 2023-04-28 DIAGNOSIS — J20.8 VIRAL BRONCHITIS: ICD-10-CM

## 2023-04-28 DIAGNOSIS — J45.21 MILD INTERMITTENT REACTIVE AIRWAY DISEASE WITH ACUTE EXACERBATION: ICD-10-CM

## 2023-04-28 PROCEDURE — 90471 IMMUNIZATION ADMIN: CPT | Performed by: FAMILY MEDICINE

## 2023-04-28 PROCEDURE — 90746 HEPB VACCINE 3 DOSE ADULT IM: CPT | Performed by: FAMILY MEDICINE

## 2023-04-28 PROCEDURE — 99213 OFFICE O/P EST LOW 20 MIN: CPT | Mod: 25 | Performed by: FAMILY MEDICINE

## 2023-04-28 RX ORDER — PREDNISONE 20 MG/1
40 TABLET ORAL DAILY
Qty: 10 TABLET | Refills: 0 | Status: SHIPPED | OUTPATIENT
Start: 2023-04-28 | End: 2023-05-03

## 2023-04-28 ASSESSMENT — FIBROSIS 4 INDEX: FIB4 SCORE: 0.81

## 2023-04-28 NOTE — PROGRESS NOTES
"CHIEF COMPLAINT / REASON FOR VISIT  Vincent Mishra is a 48 y.o. male that presents today for illness    HISTORY OF PRESENT ILLNESS  Current respiratory tract infection. Coughing, \"throat tightens up\".  Ear pressure.  Also had bilateral conjunctival erythema and crusting.  Symptoms present for approximately 1 week.    OBJECTIVE     BP (!) 142/102 (BP Location: Left arm, Patient Position: Sitting, BP Cuff Size: Adult)   Pulse 80   Temp 36.9 °C (98.4 °F) (Temporal)   Ht 1.829 m (6')   Wt (!) 129 kg (283 lb 14.4 oz)   SpO2 97%   BMI 38.50 kg/m²     PHYSICAL EXAM  Constitutional: Sitting comfortably, in no acute distress, responds to questions appropriately.  Eyes: Slight bulbar conjunctival erythema  Ears: Bilateral TM scarring  Neck: No cervical lymphadenopathy  Mouth: oral mucosa moist  Throat: Oropharynx clear  Heart: Regular S1 S2, no murmurs, rub, or gallops  Lungs: Clear to auscultation bilaterally, no wheezes, rales, or rhonchi  Extremities: No lower extremity edema  Skin: Warm and dry, no rashes or lesions on face or exposed upper extremities    ASSESSMENT & PLAN  1. Viral bronchitis  2. Mild intermittent reactive airway disease with acute exacerbation  Viral respiratory tract infection resulting in mild reactive airway exacerbation.  We will treat with 5-day course of prednisone 40 mg daily.  He has albuterol inhaler at home to use as needed.    3. Need for vaccination  - Hepatitis B Vaccine Adult 20+    4. Elevated blood pressure reading without diagnosis of hypertension  Reports that stress and his current illness are contributing to his elevated blood pressure, as well as recent sedentary lifestyle.  He will start exercising more now that the weather is nicer.  Recommended obtaining home blood pressure cuff and measuring blood pressure at least couple of times a week.  Goal is 130/80.  If consistently above goal would initiate antihypertensive.          "

## 2023-05-19 ENCOUNTER — PATIENT MESSAGE (OUTPATIENT)
Dept: MEDICAL GROUP | Facility: PHYSICIAN GROUP | Age: 48
End: 2023-05-19
Payer: COMMERCIAL

## 2023-05-19 DIAGNOSIS — R79.89 LOW TESTOSTERONE IN MALE: ICD-10-CM

## 2023-05-19 DIAGNOSIS — E29.1 HYPOGONADISM MALE: ICD-10-CM

## 2023-05-19 RX ORDER — TESTOSTERONE CYPIONATE 200 MG/ML
INJECTION, SOLUTION INTRAMUSCULAR
Qty: 6 ML | Refills: 2 | Status: SHIPPED | OUTPATIENT
Start: 2023-05-19 | End: 2024-01-18 | Stop reason: SDUPTHER

## 2024-01-18 ENCOUNTER — OFFICE VISIT (OUTPATIENT)
Dept: MEDICAL GROUP | Facility: PHYSICIAN GROUP | Age: 49
End: 2024-01-18
Payer: COMMERCIAL

## 2024-01-18 VITALS
DIASTOLIC BLOOD PRESSURE: 68 MMHG | OXYGEN SATURATION: 93 % | WEIGHT: 300 LBS | RESPIRATION RATE: 20 BRPM | SYSTOLIC BLOOD PRESSURE: 122 MMHG | BODY MASS INDEX: 40.63 KG/M2 | HEART RATE: 100 BPM | TEMPERATURE: 98.1 F | HEIGHT: 72 IN

## 2024-01-18 DIAGNOSIS — Z51.81 ENCOUNTER FOR MONITORING TESTOSTERONE REPLACEMENT THERAPY: ICD-10-CM

## 2024-01-18 DIAGNOSIS — Z79.890 ENCOUNTER FOR MONITORING TESTOSTERONE REPLACEMENT THERAPY: ICD-10-CM

## 2024-01-18 DIAGNOSIS — E29.1 PRIMARY HYPOGONADISM IN MALE: ICD-10-CM

## 2024-01-18 DIAGNOSIS — E78.2 MIXED HYPERLIPIDEMIA: ICD-10-CM

## 2024-01-18 DIAGNOSIS — Z23 NEED FOR VACCINATION: ICD-10-CM

## 2024-01-18 DIAGNOSIS — E55.9 VITAMIN D DEFICIENCY: ICD-10-CM

## 2024-01-18 DIAGNOSIS — Z85.47 HISTORY OF TESTICULAR CANCER: ICD-10-CM

## 2024-01-18 DIAGNOSIS — E66.01 MORBID OBESITY WITH BMI OF 40.0-44.9, ADULT (HCC): ICD-10-CM

## 2024-01-18 PROCEDURE — 90746 HEPB VACCINE 3 DOSE ADULT IM: CPT | Performed by: FAMILY MEDICINE

## 2024-01-18 PROCEDURE — 90471 IMMUNIZATION ADMIN: CPT | Performed by: FAMILY MEDICINE

## 2024-01-18 PROCEDURE — 99214 OFFICE O/P EST MOD 30 MIN: CPT | Mod: 25 | Performed by: FAMILY MEDICINE

## 2024-01-18 PROCEDURE — 3074F SYST BP LT 130 MM HG: CPT | Performed by: FAMILY MEDICINE

## 2024-01-18 PROCEDURE — 3078F DIAST BP <80 MM HG: CPT | Performed by: FAMILY MEDICINE

## 2024-01-18 RX ORDER — TESTOSTERONE CYPIONATE 200 MG/ML
100 INJECTION, SOLUTION INTRAMUSCULAR
Qty: 6 ML | Refills: 0 | Status: SHIPPED | OUTPATIENT
Start: 2024-01-18 | End: 2024-04-17

## 2024-01-18 ASSESSMENT — PATIENT HEALTH QUESTIONNAIRE - PHQ9: CLINICAL INTERPRETATION OF PHQ2 SCORE: 0

## 2024-01-18 ASSESSMENT — FIBROSIS 4 INDEX: FIB4 SCORE: 0.83

## 2024-01-18 NOTE — PROGRESS NOTES
CHIEF COMPLAINT / REASON FOR VISIT  Vincent Mishra is a 49 y.o. male that presents today for   Chief Complaint   Patient presents with    Medication Refill     Test     HISTORY OF PRESENT ILLNESS  Chronic primary hypogonadism, status post orchiectomy for testicular cancer.  Wants to restart testosterone injections.  Previously when on testosterone he noticed improvement of fatigue and motivation    He is going on 5 mile walks a couple times per week.  He is interested in weight loss options    OBJECTIVE     /68 (BP Location: Right arm, Patient Position: Sitting, BP Cuff Size: Adult long)   Pulse 100   Temp 36.7 °C (98.1 °F) (Temporal)   Resp 20   Ht 1.829 m (6')   Wt (!) 136 kg (300 lb)   SpO2 93%   BMI 40.69 kg/m²      PHYSICAL EXAM  Constitutional: Sitting comfortably, in no acute distress, responds to questions appropriately.  Skin: Warm and dry, no rashes or lesions on face or exposed upper extremities    ASSESSMENT & PLAN  1. Primary hypogonadism in male  2. History of testicular cancer  3. Encounter for monitoring testosterone replacement therapy  Chronic, uncontrolled, will reinitiate testosterone cypionate 100 mg IM weekly, recheck testosterone levels and CBC in 3 months along with follow-up visit.  - testosterone cypionate (DEPO-TESTOSTERONE) 200 MG/ML injection; Inject 0.5 mL into the shoulder, thigh, or buttocks every 7 days for 90 days.  Dispense: 6 mL; Refill: 0  - Testosterone, Free & Total, Adult Male (w/SHBG); Future  - CBC WITH DIFFERENTIAL; Future    4. Morbid obesity with BMI of 40.0-44.9, adult (HCC)  We discussed weight loss medications at next visit. Counseled regarding importance of regular exercise and healthy dietary habits for weight loss.  - TSH WITH REFLEX TO FT4; Future    5. Mixed hyperlipidemia  - Lipid Profile; Future    6. Vitamin D deficiency  - VITAMIN D,25 HYDROXY (DEFICIENCY); Future    7. Need for vaccination  - Hepatitis B Vaccine Adult IM    Follow up in 3  months

## 2024-02-20 ENCOUNTER — HOSPITAL ENCOUNTER (OUTPATIENT)
Dept: LAB | Facility: MEDICAL CENTER | Age: 49
End: 2024-02-20
Attending: FAMILY MEDICINE
Payer: COMMERCIAL

## 2024-02-20 DIAGNOSIS — E55.9 VITAMIN D DEFICIENCY: ICD-10-CM

## 2024-02-20 DIAGNOSIS — E29.1 PRIMARY HYPOGONADISM IN MALE: ICD-10-CM

## 2024-02-20 DIAGNOSIS — E78.2 MIXED HYPERLIPIDEMIA: ICD-10-CM

## 2024-02-20 DIAGNOSIS — Z79.890 ENCOUNTER FOR MONITORING TESTOSTERONE REPLACEMENT THERAPY: ICD-10-CM

## 2024-02-20 DIAGNOSIS — Z51.81 ENCOUNTER FOR MONITORING TESTOSTERONE REPLACEMENT THERAPY: ICD-10-CM

## 2024-02-20 DIAGNOSIS — E66.01 MORBID OBESITY WITH BMI OF 40.0-44.9, ADULT (HCC): ICD-10-CM

## 2024-02-20 LAB
25(OH)D3 SERPL-MCNC: 27 NG/ML (ref 30–100)
BASOPHILS # BLD AUTO: 0.8 % (ref 0–1.8)
BASOPHILS # BLD: 0.06 K/UL (ref 0–0.12)
CHOLEST SERPL-MCNC: 197 MG/DL (ref 100–199)
EOSINOPHIL # BLD AUTO: 0.06 K/UL (ref 0–0.51)
EOSINOPHIL NFR BLD: 0.8 % (ref 0–6.9)
ERYTHROCYTE [DISTWIDTH] IN BLOOD BY AUTOMATED COUNT: 43.5 FL (ref 35.9–50)
HCT VFR BLD AUTO: 60.4 % (ref 42–52)
HDLC SERPL-MCNC: 25 MG/DL
HGB BLD-MCNC: 20 G/DL (ref 14–18)
IMM GRANULOCYTES # BLD AUTO: 0.02 K/UL (ref 0–0.11)
IMM GRANULOCYTES NFR BLD AUTO: 0.3 % (ref 0–0.9)
LDLC SERPL CALC-MCNC: 136 MG/DL
LYMPHOCYTES # BLD AUTO: 1.07 K/UL (ref 1–4.8)
LYMPHOCYTES NFR BLD: 14.9 % (ref 22–41)
MCH RBC QN AUTO: 29 PG (ref 27–33)
MCHC RBC AUTO-ENTMCNC: 33.1 G/DL (ref 32.3–36.5)
MCV RBC AUTO: 87.7 FL (ref 81.4–97.8)
MONOCYTES # BLD AUTO: 0.67 K/UL (ref 0–0.85)
MONOCYTES NFR BLD AUTO: 9.3 % (ref 0–13.4)
NEUTROPHILS # BLD AUTO: 5.29 K/UL (ref 1.82–7.42)
NEUTROPHILS NFR BLD: 73.9 % (ref 44–72)
NRBC # BLD AUTO: 0 K/UL
NRBC BLD-RTO: 0 /100 WBC (ref 0–0.2)
PLATELET # BLD AUTO: 248 K/UL (ref 164–446)
PMV BLD AUTO: 11.4 FL (ref 9–12.9)
RBC # BLD AUTO: 6.89 M/UL (ref 4.7–6.1)
TRIGL SERPL-MCNC: 179 MG/DL (ref 0–149)
TSH SERPL DL<=0.005 MIU/L-ACNC: 2.95 UIU/ML (ref 0.38–5.33)
WBC # BLD AUTO: 7.2 K/UL (ref 4.8–10.8)

## 2024-02-20 PROCEDURE — 85025 COMPLETE CBC W/AUTO DIFF WBC: CPT

## 2024-02-20 PROCEDURE — 84270 ASSAY OF SEX HORMONE GLOBUL: CPT

## 2024-02-20 PROCEDURE — 36415 COLL VENOUS BLD VENIPUNCTURE: CPT

## 2024-02-20 PROCEDURE — 84443 ASSAY THYROID STIM HORMONE: CPT

## 2024-02-20 PROCEDURE — 84402 ASSAY OF FREE TESTOSTERONE: CPT

## 2024-02-20 PROCEDURE — 82306 VITAMIN D 25 HYDROXY: CPT

## 2024-02-20 PROCEDURE — 80061 LIPID PANEL: CPT

## 2024-02-20 PROCEDURE — 84403 ASSAY OF TOTAL TESTOSTERONE: CPT

## 2024-02-22 ENCOUNTER — OFFICE VISIT (OUTPATIENT)
Dept: MEDICAL GROUP | Facility: PHYSICIAN GROUP | Age: 49
End: 2024-02-22
Payer: COMMERCIAL

## 2024-02-22 VITALS
SYSTOLIC BLOOD PRESSURE: 118 MMHG | WEIGHT: 294 LBS | OXYGEN SATURATION: 97 % | TEMPERATURE: 98.4 F | HEART RATE: 90 BPM | RESPIRATION RATE: 14 BRPM | HEIGHT: 72 IN | DIASTOLIC BLOOD PRESSURE: 68 MMHG | BODY MASS INDEX: 39.82 KG/M2

## 2024-02-22 DIAGNOSIS — Z51.81 ENCOUNTER FOR MONITORING TESTOSTERONE REPLACEMENT THERAPY: ICD-10-CM

## 2024-02-22 DIAGNOSIS — Z79.890 ENCOUNTER FOR MONITORING TESTOSTERONE REPLACEMENT THERAPY: ICD-10-CM

## 2024-02-22 DIAGNOSIS — E78.2 MIXED HYPERLIPIDEMIA: ICD-10-CM

## 2024-02-22 DIAGNOSIS — E29.1 PRIMARY HYPOGONADISM IN MALE: ICD-10-CM

## 2024-02-22 DIAGNOSIS — E66.9 OBESITY (BMI 30-39.9): ICD-10-CM

## 2024-02-22 DIAGNOSIS — D75.1 POLYCYTHEMIA: ICD-10-CM

## 2024-02-22 LAB
SHBG SERPL-SCNC: 21 NMOL/L (ref 17–56)
TESTOST FREE MFR SERPL: 2.5 % (ref 1.6–2.9)
TESTOST FREE SERPL-MCNC: 236 PG/ML (ref 47–244)
TESTOST SERPL-MCNC: 932 NG/DL (ref 300–890)

## 2024-02-22 PROCEDURE — 3074F SYST BP LT 130 MM HG: CPT | Performed by: FAMILY MEDICINE

## 2024-02-22 PROCEDURE — 3078F DIAST BP <80 MM HG: CPT | Performed by: FAMILY MEDICINE

## 2024-02-22 PROCEDURE — 99214 OFFICE O/P EST MOD 30 MIN: CPT | Performed by: FAMILY MEDICINE

## 2024-02-22 RX ORDER — ROSUVASTATIN CALCIUM 5 MG/1
5 TABLET, COATED ORAL EVERY EVENING
Qty: 90 TABLET | Refills: 3 | Status: SHIPPED | OUTPATIENT
Start: 2024-02-22

## 2024-02-22 RX ORDER — TIRZEPATIDE 2.5 MG/.5ML
2.5 INJECTION, SOLUTION SUBCUTANEOUS
Qty: 6 ML | Refills: 3 | Status: SHIPPED | OUTPATIENT
Start: 2024-02-22

## 2024-02-22 ASSESSMENT — FIBROSIS 4 INDEX: FIB4 SCORE: 0.82

## 2024-02-22 NOTE — PROGRESS NOTES
CHIEF COMPLAINT / REASON FOR VISIT  Vincent Mishra is a 49 y.o. male that presents today for   Chief Complaint   Patient presents with    Follow-Up     HISTORY OF PRESENT ILLNESS  Has accidentally been taking 200 mg per week rather than the prescribed 100 mg per week    OBJECTIVE     /68 (BP Location: Left arm, Patient Position: Sitting, BP Cuff Size: Adult)   Pulse 90   Temp 36.9 °C (98.4 °F) (Temporal)   Resp 14   Ht 1.829 m (6')   Wt (!) 133 kg (294 lb)   SpO2 97%   BMI 39.87 kg/m²      PHYSICAL EXAM  Constitutional: Sitting comfortably, in no acute distress, responds to questions appropriately  Skin: Warm and dry, no rashes or lesions on face or exposed upper extremities    ASSESSMENT & PLAN  1. Primary hypogonadism in male  2. Polycythemia  3. Encounter for monitoring testosterone replacement therapy  Overcorrected since patient has accidentally been taking 200 mg/week rather than the prescribed 100 mg/week of testosterone cypionate.  Has associated polycythemia.  Recommended doing blood donation immediately.  Recommended reducing dose to the prescribed 100 mg/week, rechecking testosterone and CBC in 3 months, with subsequent follow-up visit  - CBC WITH DIFFERENTIAL; Future  - Testosterone, Free & Total, Adult Male (w/SHBG); Future    4. Obesity (BMI 30-39.9)  Chronic, uncontrolled with BMI 39.87, with associated comorbidities of obstructive sleep apnea and mixed hyperlipidemia.  In conjunction with dietary and exercise intervention, will initiate tirzepatide 2.5 mg weekly for weight loss.  Follow-up in 3 months.  - Tirzepatide-Weight Management (ZEPBOUND) 2.5 MG/0.5ML Solution Auto-injector; Inject 2.5 mg under the skin every 7 days.  Dispense: 6 mL; Refill: 3    5. Mixed hyperlipidemia  Chronic, uncontrolled, recommend initiating rosuvastatin 5 mg daily for long-term ASCVD risk reduction.  The 10-year ASCVD risk score (Humberto DK, et al., 2019) is: 5.6%  - rosuvastatin (CRESTOR) 5 MG Tab;  Take 1 Tablet by mouth every evening.  Dispense: 90 Tablet; Refill: 3

## 2024-04-25 ENCOUNTER — HOSPITAL ENCOUNTER (OUTPATIENT)
Dept: LAB | Facility: MEDICAL CENTER | Age: 49
End: 2024-04-25
Attending: FAMILY MEDICINE
Payer: COMMERCIAL

## 2024-04-25 DIAGNOSIS — Z79.890 ENCOUNTER FOR MONITORING TESTOSTERONE REPLACEMENT THERAPY: ICD-10-CM

## 2024-04-25 DIAGNOSIS — Z51.81 ENCOUNTER FOR MONITORING TESTOSTERONE REPLACEMENT THERAPY: ICD-10-CM

## 2024-04-25 DIAGNOSIS — E29.1 PRIMARY HYPOGONADISM IN MALE: ICD-10-CM

## 2024-04-25 LAB
BASOPHILS # BLD AUTO: 0.6 % (ref 0–1.8)
BASOPHILS # BLD: 0.03 K/UL (ref 0–0.12)
EOSINOPHIL # BLD AUTO: 0.08 K/UL (ref 0–0.51)
EOSINOPHIL NFR BLD: 1.6 % (ref 0–6.9)
ERYTHROCYTE [DISTWIDTH] IN BLOOD BY AUTOMATED COUNT: 43.5 FL (ref 35.9–50)
HCT VFR BLD AUTO: 56.7 % (ref 42–52)
HGB BLD-MCNC: 18.2 G/DL (ref 14–18)
IMM GRANULOCYTES # BLD AUTO: 0.02 K/UL (ref 0–0.11)
IMM GRANULOCYTES NFR BLD AUTO: 0.4 % (ref 0–0.9)
LYMPHOCYTES # BLD AUTO: 1.09 K/UL (ref 1–4.8)
LYMPHOCYTES NFR BLD: 21.6 % (ref 22–41)
MCH RBC QN AUTO: 28.7 PG (ref 27–33)
MCHC RBC AUTO-ENTMCNC: 32.1 G/DL (ref 32.3–36.5)
MCV RBC AUTO: 89.3 FL (ref 81.4–97.8)
MONOCYTES # BLD AUTO: 0.51 K/UL (ref 0–0.85)
MONOCYTES NFR BLD AUTO: 10.1 % (ref 0–13.4)
NEUTROPHILS # BLD AUTO: 3.31 K/UL (ref 1.82–7.42)
NEUTROPHILS NFR BLD: 65.7 % (ref 44–72)
NRBC # BLD AUTO: 0 K/UL
NRBC BLD-RTO: 0 /100 WBC (ref 0–0.2)
PLATELET # BLD AUTO: 211 K/UL (ref 164–446)
PMV BLD AUTO: 11.3 FL (ref 9–12.9)
RBC # BLD AUTO: 6.35 M/UL (ref 4.7–6.1)
WBC # BLD AUTO: 5 K/UL (ref 4.8–10.8)

## 2024-04-25 PROCEDURE — 84403 ASSAY OF TOTAL TESTOSTERONE: CPT

## 2024-04-25 PROCEDURE — 84402 ASSAY OF FREE TESTOSTERONE: CPT

## 2024-04-25 PROCEDURE — 36415 COLL VENOUS BLD VENIPUNCTURE: CPT

## 2024-04-25 PROCEDURE — 85025 COMPLETE CBC W/AUTO DIFF WBC: CPT

## 2024-04-25 PROCEDURE — 84270 ASSAY OF SEX HORMONE GLOBUL: CPT

## 2024-04-26 LAB
SHBG SERPL-SCNC: 18 NMOL/L (ref 17–56)
TESTOST FREE MFR SERPL: 2.4 % (ref 1.6–2.9)
TESTOST FREE SERPL-MCNC: 72 PG/ML (ref 47–244)
TESTOST SERPL-MCNC: 305 NG/DL (ref 300–890)

## 2024-05-03 ENCOUNTER — DOCUMENTATION (OUTPATIENT)
Dept: HEALTH INFORMATION MANAGEMENT | Facility: OTHER | Age: 49
End: 2024-05-03
Payer: COMMERCIAL

## 2024-05-23 ENCOUNTER — OFFICE VISIT (OUTPATIENT)
Dept: MEDICAL GROUP | Facility: PHYSICIAN GROUP | Age: 49
End: 2024-05-23
Payer: COMMERCIAL

## 2024-05-23 DIAGNOSIS — Z79.890 ENCOUNTER FOR MONITORING TESTOSTERONE REPLACEMENT THERAPY: ICD-10-CM

## 2024-05-23 DIAGNOSIS — Z12.11 COLON CANCER SCREENING: ICD-10-CM

## 2024-05-23 DIAGNOSIS — Z51.81 ENCOUNTER FOR MONITORING TESTOSTERONE REPLACEMENT THERAPY: ICD-10-CM

## 2024-05-23 DIAGNOSIS — F41.1 GENERALIZED ANXIETY DISORDER: ICD-10-CM

## 2024-05-23 DIAGNOSIS — E29.1 PRIMARY HYPOGONADISM IN MALE: ICD-10-CM

## 2024-05-23 PROCEDURE — 99214 OFFICE O/P EST MOD 30 MIN: CPT | Performed by: FAMILY MEDICINE

## 2024-05-23 RX ORDER — SYRINGE, DISPOSABLE, 1 ML
SYRINGE, EMPTY DISPOSABLE MISCELLANEOUS
Qty: 100 EACH | Refills: 3 | Status: SHIPPED | OUTPATIENT
Start: 2024-05-23

## 2024-05-23 RX ORDER — FLUOXETINE HYDROCHLORIDE 20 MG/1
20 CAPSULE ORAL DAILY
Qty: 90 CAPSULE | Refills: 3 | Status: SHIPPED | OUTPATIENT
Start: 2024-05-23

## 2024-05-23 RX ORDER — TESTOSTERONE CYPIONATE 200 MG/ML
120 INJECTION, SOLUTION INTRAMUSCULAR
Qty: 10 ML | Refills: 0 | Status: SHIPPED | OUTPATIENT
Start: 2024-05-23 | End: 2024-05-29 | Stop reason: SDUPTHER

## 2024-05-23 NOTE — PROGRESS NOTES
CHIEF COMPLAINT / REASON FOR VISIT  Vincent Mishra is a 49 y.o. male that presents today for No chief complaint on file.    HISTORY OF PRESENT ILLNESS  Here for lab follow-up    Using Lasix IV has been worse recently, was previously on fluoxetine 20 mg daily which was effective.  Inquires about restarting this    OBJECTIVE   There were no vitals taken for this visit. There is no height or weight on file to calculate BMI.    PHYSICAL EXAM  Constitutional: Sitting comfortably, in no acute distress, responds to questions appropriately.  Skin: Warm and dry, no rashes or lesions on face or exposed upper extremities    ASSESSMENT & PLAN  1. Primary hypogonadism in male  2. Encounter for monitoring testosterone replacement therapy  Chronic, uncontrolled.  Total testosterone level on the low end of normal.  Will increase testosterone cypionate IM to 120 mg weekly.  Recheck testosterone levels and CBC in 3 months.  Recommend donating blood again for mild polycythemia  - testosterone cypionate (DEPO-TESTOSTERONE) 200 MG/ML injection; Inject 0.6 mL into the shoulder, thigh, or buttocks every 7 days for 118 days.  Dispense: 10 mL; Refill: 0  - syringe (EASY GLIDE LUER LOCK SYRINGE) 1 ML Misc; Use once weekly for testosterone injections  Dispense: 100 Each; Refill: 3    3. Generalized anxiety disorder  Chronic, uncontrolled, restart fluoxetine 20 mg daily.  - FLUoxetine (PROZAC) 20 MG Cap; Take 1 Capsule by mouth every day.  Dispense: 90 Capsule; Refill: 3    4. Colon cancer screening  - Referral to GI for Colonoscopy    Will need to establish with new PCP for follow-up of testosterone and anxiety

## 2024-05-29 DIAGNOSIS — Z79.890 ENCOUNTER FOR MONITORING TESTOSTERONE REPLACEMENT THERAPY: ICD-10-CM

## 2024-05-29 DIAGNOSIS — Z51.81 ENCOUNTER FOR MONITORING TESTOSTERONE REPLACEMENT THERAPY: ICD-10-CM

## 2024-05-29 DIAGNOSIS — E29.1 PRIMARY HYPOGONADISM IN MALE: ICD-10-CM

## 2024-05-29 RX ORDER — TESTOSTERONE CYPIONATE 200 MG/ML
120 INJECTION, SOLUTION INTRAMUSCULAR
Qty: 7 ML | Refills: 0 | Status: SHIPPED | OUTPATIENT
Start: 2024-05-29 | End: 2024-08-19

## 2024-09-27 ENCOUNTER — OFFICE VISIT (OUTPATIENT)
Dept: MEDICAL GROUP | Facility: PHYSICIAN GROUP | Age: 49
End: 2024-09-27
Payer: COMMERCIAL

## 2024-09-27 VITALS
RESPIRATION RATE: 18 BRPM | TEMPERATURE: 98.5 F | BODY MASS INDEX: 40.5 KG/M2 | OXYGEN SATURATION: 96 % | DIASTOLIC BLOOD PRESSURE: 88 MMHG | HEART RATE: 86 BPM | HEIGHT: 72 IN | WEIGHT: 299 LBS | SYSTOLIC BLOOD PRESSURE: 142 MMHG

## 2024-09-27 DIAGNOSIS — Z76.89 ENCOUNTER TO ESTABLISH CARE: ICD-10-CM

## 2024-09-27 DIAGNOSIS — R31.9 HEMATURIA, UNSPECIFIED TYPE: ICD-10-CM

## 2024-09-27 DIAGNOSIS — Z91.89 AT RISK FOR SLEEP APNEA: ICD-10-CM

## 2024-09-27 DIAGNOSIS — E66.01 CLASS 3 SEVERE OBESITY DUE TO EXCESS CALORIES WITH SERIOUS COMORBIDITY AND BODY MASS INDEX (BMI) OF 40.0 TO 44.9 IN ADULT (HCC): ICD-10-CM

## 2024-09-27 DIAGNOSIS — R79.89 LOW TESTOSTERONE IN MALE: ICD-10-CM

## 2024-09-27 DIAGNOSIS — Z11.59 NEED FOR HEPATITIS C SCREENING TEST: ICD-10-CM

## 2024-09-27 DIAGNOSIS — E78.2 MIXED HYPERLIPIDEMIA: ICD-10-CM

## 2024-09-27 DIAGNOSIS — K21.9 GASTROESOPHAGEAL REFLUX DISEASE, UNSPECIFIED WHETHER ESOPHAGITIS PRESENT: ICD-10-CM

## 2024-09-27 DIAGNOSIS — Z23 NEED FOR VACCINATION: ICD-10-CM

## 2024-09-27 PROBLEM — E66.813 CLASS 3 SEVERE OBESITY DUE TO EXCESS CALORIES WITH SERIOUS COMORBIDITY AND BODY MASS INDEX (BMI) OF 40.0 TO 44.9 IN ADULT (HCC): Status: ACTIVE | Noted: 2017-06-30

## 2024-09-27 LAB
APPEARANCE UR: NORMAL
BILIRUB UR STRIP-MCNC: NORMAL MG/DL
COLOR UR AUTO: NORMAL
GLUCOSE UR STRIP.AUTO-MCNC: NORMAL MG/DL
KETONES UR STRIP.AUTO-MCNC: NORMAL MG/DL
LEUKOCYTE ESTERASE UR QL STRIP.AUTO: NORMAL
NITRITE UR QL STRIP.AUTO: NORMAL
PH UR STRIP.AUTO: 5.5 [PH] (ref 5–8)
PROT UR QL STRIP: 30 MG/DL
RBC UR QL AUTO: NORMAL
SP GR UR STRIP.AUTO: 1.03
UROBILINOGEN UR STRIP-MCNC: 0.2 MG/DL

## 2024-09-27 RX ORDER — ZOLPIDEM TARTRATE 5 MG/1
TABLET ORAL
Qty: 2 TABLET | Refills: 0 | Status: SHIPPED | OUTPATIENT
Start: 2024-09-27 | End: 2024-09-28

## 2024-09-27 ASSESSMENT — FIBROSIS 4 INDEX: FIB4 SCORE: 0.97

## 2024-09-27 NOTE — ASSESSMENT & PLAN NOTE
Chronic, ongoing. Reports reduced libido, overweight, low energy, difficulty gaining muscle mass. Has noticed some benefit with testosterone replacement, would like to continue this. Currently taking testosterone prescribed by marta.    Orders:    TESTOSTERONE, FREE AND TOTAL; Future    CBC WITH DIFFERENTIAL; Future

## 2024-09-27 NOTE — ASSESSMENT & PLAN NOTE
Chronic, unstable. Discussed healthy lifestyle recommendations.   Denies exercising currently, currently 299 lb, has been as high as 350 lb.   Reports nutrition could be better.  Discussed weight lost assistance options.     Orders:    Lipid Profile; Future    Patient identified as having weight management issue.  Appropriate orders and counseling given.

## 2024-09-27 NOTE — PROGRESS NOTES
Subjective:     CC: Diagnoses of Hematuria, unspecified type, Encounter to establish care, Low testosterone in male, At risk for sleep apnea, Mixed hyperlipidemia, Class 3 severe obesity due to excess calories with serious comorbidity and body mass index (BMI) of 40.0 to 44.9 in adult (HCC), Need for hepatitis C screening test, Gastroesophageal reflux disease, unspecified whether esophagitis present, and Need for vaccination were pertinent to this visit.    Pt presents today to establish care with me. Prior pcp marta.     He has multiple concerns to discuss today.   Reports ongoing dark urine, concern for blood. Obesity, low energy, low testosterone.    HPI:   Vincent presents today with    Problem   Low Testosterone in Male   Gerd (Gastroesophageal Reflux Disease)   Class 3 Severe Obesity Due to Excess Calories With Serious Comorbidity and Body Mass Index (Bmi) of 40.0 to 44.9 in Adult (Hcc)   Hyperlipidemia   At Risk for Sleep Apnea     ROS:  Review of Systems   Constitutional:  Positive for malaise/fatigue.   Genitourinary:         Dark urine   All other systems reviewed and are negative.      Objective:     Exam:  BP (!) 142/88 (BP Location: Right arm, Patient Position: Sitting, BP Cuff Size: Adult)   Pulse 86   Temp 36.9 °C (98.5 °F) (Temporal)   Resp 18   Ht 1.829 m (6')   Wt (!) 136 kg (299 lb)   SpO2 96%   BMI 40.55 kg/m²  Body mass index is 40.55 kg/m².    Physical Exam  Vitals reviewed.   Constitutional:       General: He is not in acute distress.     Appearance: Normal appearance. He is obese. He is not ill-appearing.   HENT:      Head: Normocephalic and atraumatic.   Cardiovascular:      Rate and Rhythm: Normal rate.      Pulses: Normal pulses.   Pulmonary:      Effort: Pulmonary effort is normal. No respiratory distress.   Skin:     General: Skin is warm and dry.      Findings: No rash.   Neurological:      General: No focal deficit present.      Mental Status: He is alert and oriented to  person, place, and time.   Psychiatric:         Mood and Affect: Mood normal.         Behavior: Behavior normal.         Labs:    Latest Reference Range & Units 04/25/24 06:40   WBC 4.8 - 10.8 K/uL 5.0   RBC 4.70 - 6.10 M/uL 6.35 (H)   Hemoglobin 14.0 - 18.0 g/dL 18.2 (H)   Hematocrit 42.0 - 52.0 % 56.7 (H)   MCV 81.4 - 97.8 fL 89.3   MCH 27.0 - 33.0 pg 28.7   MCHC 32.3 - 36.5 g/dL 32.1 (L)   RDW 35.9 - 50.0 fL 43.5   Platelet Count 164 - 446 K/uL 211   MPV 9.0 - 12.9 fL 11.3   Neutrophils-Polys 44.00 - 72.00 % 65.70   Neutrophils (Absolute) 1.82 - 7.42 K/uL 3.31   Lymphocytes 22.00 - 41.00 % 21.60 (L)   Lymphs (Absolute) 1.00 - 4.80 K/uL 1.09   Monocytes 0.00 - 13.40 % 10.10   Monos (Absolute) 0.00 - 0.85 K/uL 0.51   Eosinophils 0.00 - 6.90 % 1.60   Eos (Absolute) 0.00 - 0.51 K/uL 0.08   Basophils 0.00 - 1.80 % 0.60   Baso (Absolute) 0.00 - 0.12 K/uL 0.03   Immature Granulocytes 0.00 - 0.90 % 0.40   Immature Granulocytes (abs) 0.00 - 0.11 K/uL 0.02   Nucleated RBC 0.00 - 0.20 /100 WBC 0.00   NRBC (Absolute) K/uL 0.00   Free Testosterone 47 - 244 pg/mL 72   Sex Hormone Bind Globulin 17 - 56 nmol/L 18   Testosterone % Free 1.6 - 2.9 % 2.4   Testosterone,Total 300 - 890 ng/dL 305   (H): Data is abnormally high  (L): Data is abnormally low    Assessment & Plan:     49 y.o. male with the following -     Assessment & Plan  Hematuria, unspecified type    Orders:    POCT Urinalysis    Encounter to establish care         Low testosterone in male  Chronic, ongoing. Reports reduced libido, overweight, low energy, difficulty gaining muscle mass. Has noticed some benefit with testosterone replacement, would like to continue this. Currently taking testosterone prescribed by marta.    Orders:    TESTOSTERONE, FREE AND TOTAL; Future    CBC WITH DIFFERENTIAL; Future    At risk for sleep apnea  Stopbang 5. Denies ever having sleep study to confirm MIGUEL diagnosis.     Orders:    Polysomnography, 4 or More; Future    Referral to  Pulmonary and Sleep Medicine    zolpidem (AMBIEN) 5 MG Tab; Take 1- 2 tab(s) by mouth at bedtime for sleep study (bring to sleep study)    Mixed hyperlipidemia  Chronic, unstable. Reports not taking rosuvastatin as he does not want to.  The 10-year ASCVD risk score (Humberto WILLIAMSON, et al., 2019) is: 7.7%  Making efforts to improve risk, does not want to take statins           Class 3 severe obesity due to excess calories with serious comorbidity and body mass index (BMI) of 40.0 to 44.9 in adult (HCC)  Chronic, unstable. Discussed healthy lifestyle recommendations.   Denies exercising currently, currently 299 lb, has been as high as 350 lb.   Reports nutrition could be better.  Discussed weight lost assistance options.     Orders:    Lipid Profile; Future    Patient identified as having weight management issue.  Appropriate orders and counseling given.    Need for hepatitis C screening test    Orders:    HEP C VIRUS ANTIBODY; Future    Gastroesophageal reflux disease, unspecified whether esophagitis present  Chronic, ongoing. Reports intermittent symptoms of reflux. Using apple cider vinegar, milk as needed.reports intermittent epigastric fullness.           Need for vaccination    Orders:    Hepatitis B Vaccine Adult 20+    Pneumococcal Conjugate Vaccine 20-Valent (6 wks+)        Patient was educated in proper administration of medication(s) ordered today including safety, possible SE, risks, benefits, rationale and alternatives to therapy.   Supportive care, differential diagnoses, and indications for immediate follow-up discussed with patient.    Pathogenesis of diagnosis discussed including typical length and natural progression.    Instructed to return to clinic or nearest emergency department for any change in condition, further concerns, or worsening of symptoms.  Patient states understanding of the plan of care and discharge instructions.    Return in about 4 weeks (around 10/25/2024), or if symptoms worsen or  fail to improve, for Labs.    Please note that this dictation was created using voice recognition software. I have made every reasonable attempt to correct obvious errors, but I expect that there are errors of grammar and possibly content that I did not discover before finalizing the note.

## 2024-09-27 NOTE — ASSESSMENT & PLAN NOTE
Chronic, ongoing. Reports intermittent symptoms of reflux. Using apple cider vinegar, milk as needed.reports intermittent epigastric fullness.

## 2024-09-27 NOTE — ASSESSMENT & PLAN NOTE
Chronic, unstable. Reports not taking rosuvastatin as he does not want to.  The 10-year ASCVD risk score (Humberto WILLIAMSON, et al., 2019) is: 7.7%  Making efforts to improve risk, does not want to take statins

## 2024-09-27 NOTE — ASSESSMENT & PLAN NOTE
Sugar 5. Denies ever having sleep study to confirm MIGUEL diagnosis.     Orders:    Polysomnography, 4 or More; Future    Referral to Pulmonary and Sleep Medicine    zolpidem (AMBIEN) 5 MG Tab; Take 1- 2 tab(s) by mouth at bedtime for sleep study (bring to sleep study)

## 2024-09-27 NOTE — PATIENT INSTRUCTIONS
"Cholesterol-lowering supplements may be helpful  Diet and exercise are proven ways to reduce cholesterol. Cholesterol-lowering supplements may help, too.  By [facility] Staff  If you're worried about your cholesterol level and have started exercising and eating healthier foods, you might wonder if a dietary supplement could help. With your doctor's OK, here are some cholesterol-improving supplements to consider.  Cholesterol-improving supplement What it might do Side effects and drug interactions   Berberine May reduce low-density lipoprotein (LDL, or \"bad\") cholesterol and triglycerides May cause diarrhea, constipation, gas, nausea or vomiting; may cause harm to babies during pregnancy and breastfeeding   Fish oil May reduce triglycerides May cause a fishy aftertaste, bad breath, gas, nausea, vomiting or diarrhea; may interact with some blood-thinning medications   Flaxseed, ground May reduce LDL cholesterol May cause gas, bloating or diarrhea; may interact with some blood-thinning medications   Garlic May slightly reduce cholesterol but studies have been conflicting May cause bad breath, body odor, nausea, vomiting and gas; may interact with some blood-thinning medications   Green tea or green tea extract May lower LDL cholesterol May cause nausea, vomiting, gas or diarrhea; may interact with blood-thinning medications   Niacin May lower LDL cholesterol and triglycerides; may improve high-density lipoprotein (HDL, or \"good\") cholesterol May cause itching and flushing, which are more common at the higher doses usually needed to have an effect on cholesterol   Plant stanols and sterols May reduce LDL cholesterol, particularly in people with a genetic condition that causes high cholesterol (familial hypercholesterolemia) May cause diarrhea   Red yeast rice -- Natural doesn't mean safe  Some red yeast rice products contain a substance (monacolin K) that is chemically identical to the active ingredient in lovastatin " (Altoprev), a prescription medication that lowers cholesterol. Because there is variability in quality from , the amount of monacolin K can vary widely from product to product.  Products that contain monacolin K can cause the same types of side effects as lovastatin, which include damage to the muscles, kidneys and liver. In the [place], the Food and Drug Administration has ruled that dietary supplements that contain more than trace amounts of monacolin K are unapproved drugs and can't be sold legally as dietary supplements.  Dietary supplements may not be enough  While dietary supplements can help, you might also need prescription medications to get your cholesterol numbers to a safe level. Be sure to tell your doctor if you take any type of dietary supplement, because some can interact with medications you may be taking.  SLEEP STUDY INSTRUCTIONS    1. Our main concern is to provide the best test and evaluation of your sleep and your cooperation in following the guidelines is very necessary.    2. We have no facilities for family members or guests at the sleep center. Special arrangements will be made for children requiring overnight sleep studies.    3. Unless otherwise instructed, AVOID alcoholic beverages on the day of your sleep study.    4. DO NOT drink coffee or caffeine-containing beverages after 12:00 noon on the day of your sleep study.    5. There is NO smoking at the sleep center.    6. Try to maintain a usual daytime schedule prior to the study (avoid unusual physical activity or meals).    7. DO NOT take a nap on the day of your study.    8. This is an outpatient procedure (test); therefore, nursing services, medications, and meals ARE NOT provided. If you take medications, bring them with you and take them on the schedule you do at home.    9. Please fill your sleep aid prescription (Ambien or Lunesta) and bring to your sleep study. Even patients who normally have no problem going  to sleep often need a sleep aid in this different environment.    10. We ask that you wear conventional sleep attire (pajamas or sweats) for the sleep study. We discourage patients from wearing only their underwear to bed. We recommend two-piece pajamas as the techs will need to apply sensors to your stomach.    11. Please shampoo your hair the day of the sleep study. Please DO NOT use any other hair or skin products before your arrival (e.g., mousse, gel, hair or body spray, perfume, body lotion etc.) NOTE: Women should not wear heavy makeup prior to arrival as some wires are taped to the face area.    12. The technician will be applying several small electrodes to the scalp, eye area, chin, chest, and legs, plus respiratory effort belts around the chest. Also, there will be a device placed directly under the nose. (THIS WILL NOT OBSTRUCT YOUR BREATHING.) This is a painless procedure and the skin is not broken.    13. The test is generally completed in six to eight hours; We are usually done between 6 - 7 a.m., unless you are scheduled for a nap study. You may need to come back another night for a second study to complete your treatment plan.    14. Patients who are scheduled for an MSLT (nap study) will stay at the sleep center for the day following their nighttime study. You will be notified if a nap study was ordered for you at the time the night study is scheduled. Generally, patients having a nap study will leave the sleep center by 4 p.m.    15. You will need to bring food for the following day if you are scheduled for a nap study. A refrigerator and microwave are available.    16. A bathroom is available for your use.    17. We are able to adjust the room temperature for your comfort. Please let the technologist know if you are uncomfortable during the study.    18. If you sleep better with a special pillow or stuffed animal, you may bring it along. Service animals are the only live animals permitted.    19.  [employer] T.V. is available.    20. You will be scheduled for a follow-up appointment three to five days after the sleep study to review your results.    21. A copy of your sleep study is sent to the referring physician approximately two weeks after your study.    22. Any questions can be directed to our staff at [phone].    23. If CPAP therapy is applied, a home unit will be ordered for you through the [employer] medical equipment company. You will be contacted to schedule delivery after insurance authorization.      Ambien Letter of Caution    You have been given this prescription for Ambien in case you have difficulty sleeping the night of your sleep study. Your doctor would like you to get the prescription filled and bring the medication with you to your sleep study appointment. Do not use it before your sleep study.     We make every effort to accomplish all we need in one sleep study. If you do not sleep enough during the first half of the night, you may need to have another sleep study to complete the treatment portion of the test. To help avoid the possibility of a second sleep study, we ask you to bring the Ambien with you. Do not take the Ambien before you arrive at the Sleep Center. The Ambien should only be taken once your sleep technologist has finished attaching the sensors. Your technologist may suggest you take the Ambien if you are unable to fall asleep within the first 30 minutes.     The prescription you have been given is for two (2) 5 mg doses. Do not take the Ambien if you have consumed any alcohol or have taken any narcotic medications on the day of your sleep test. Please tell your physician or your sleep technologist if you have any medication allergies or have had problems with sleep medications in the past.     Ambien does not affect the results of the sleep study. Your doctor has determined this medication is safe for you to take. You are not required to take the Ambien; however, we still  recommend you fill the prescription and bring it with you even if you feel you won’t need it. Our technicians are unable to administer any sleep aides at your study but will do their best to aid in your comfort.     If you take Ambien during your study, you may need to sleep later to make sure you are fully awake before leaving the Sleep Center. Ambien can impair alertness and motor coordination. We recommend you plan on staying as long as needed in the morning or arrange transportation. Please read all the information provided to you by your pharmacist about Ambien.             Thank you,                      [facility]

## 2024-11-13 ENCOUNTER — HOSPITAL ENCOUNTER (OUTPATIENT)
Dept: LAB | Facility: MEDICAL CENTER | Age: 49
End: 2024-11-13
Payer: COMMERCIAL

## 2024-11-13 DIAGNOSIS — E66.813 CLASS 3 SEVERE OBESITY DUE TO EXCESS CALORIES WITH SERIOUS COMORBIDITY AND BODY MASS INDEX (BMI) OF 40.0 TO 44.9 IN ADULT (HCC): ICD-10-CM

## 2024-11-13 DIAGNOSIS — E66.01 CLASS 3 SEVERE OBESITY DUE TO EXCESS CALORIES WITH SERIOUS COMORBIDITY AND BODY MASS INDEX (BMI) OF 40.0 TO 44.9 IN ADULT (HCC): ICD-10-CM

## 2024-11-13 DIAGNOSIS — Z11.59 NEED FOR HEPATITIS C SCREENING TEST: ICD-10-CM

## 2024-11-13 DIAGNOSIS — R79.89 LOW TESTOSTERONE IN MALE: ICD-10-CM

## 2024-11-13 LAB
BASOPHILS # BLD AUTO: 0.4 % (ref 0–1.8)
BASOPHILS # BLD: 0.03 K/UL (ref 0–0.12)
CHOLEST SERPL-MCNC: 185 MG/DL (ref 100–199)
EOSINOPHIL # BLD AUTO: 0.08 K/UL (ref 0–0.51)
EOSINOPHIL NFR BLD: 1.1 % (ref 0–6.9)
ERYTHROCYTE [DISTWIDTH] IN BLOOD BY AUTOMATED COUNT: 43.8 FL (ref 35.9–50)
HCT VFR BLD AUTO: 57.1 % (ref 42–52)
HCV AB SER QL: NORMAL
HDLC SERPL-MCNC: 20 MG/DL
HGB BLD-MCNC: 18.3 G/DL (ref 14–18)
IMM GRANULOCYTES # BLD AUTO: 0.02 K/UL (ref 0–0.11)
IMM GRANULOCYTES NFR BLD AUTO: 0.3 % (ref 0–0.9)
LDLC SERPL CALC-MCNC: 115 MG/DL
LYMPHOCYTES # BLD AUTO: 1.08 K/UL (ref 1–4.8)
LYMPHOCYTES NFR BLD: 15 % (ref 22–41)
MCH RBC QN AUTO: 28.4 PG (ref 27–33)
MCHC RBC AUTO-ENTMCNC: 32 G/DL (ref 32.3–36.5)
MCV RBC AUTO: 88.7 FL (ref 81.4–97.8)
MONOCYTES # BLD AUTO: 0.83 K/UL (ref 0–0.85)
MONOCYTES NFR BLD AUTO: 11.5 % (ref 0–13.4)
NEUTROPHILS # BLD AUTO: 5.18 K/UL (ref 1.82–7.42)
NEUTROPHILS NFR BLD: 71.7 % (ref 44–72)
NRBC # BLD AUTO: 0 K/UL
NRBC BLD-RTO: 0 /100 WBC (ref 0–0.2)
PLATELET # BLD AUTO: 252 K/UL (ref 164–446)
PMV BLD AUTO: 10.7 FL (ref 9–12.9)
RBC # BLD AUTO: 6.44 M/UL (ref 4.7–6.1)
TRIGL SERPL-MCNC: 250 MG/DL (ref 0–149)
WBC # BLD AUTO: 7.2 K/UL (ref 4.8–10.8)

## 2024-11-13 PROCEDURE — 85025 COMPLETE CBC W/AUTO DIFF WBC: CPT

## 2024-11-13 PROCEDURE — 84403 ASSAY OF TOTAL TESTOSTERONE: CPT

## 2024-11-13 PROCEDURE — 36415 COLL VENOUS BLD VENIPUNCTURE: CPT

## 2024-11-13 PROCEDURE — 80061 LIPID PANEL: CPT

## 2024-11-13 PROCEDURE — 84402 ASSAY OF FREE TESTOSTERONE: CPT

## 2024-11-13 PROCEDURE — 86803 HEPATITIS C AB TEST: CPT

## 2024-11-13 PROCEDURE — 84270 ASSAY OF SEX HORMONE GLOBUL: CPT

## 2024-11-15 ENCOUNTER — OFFICE VISIT (OUTPATIENT)
Dept: MEDICAL GROUP | Facility: PHYSICIAN GROUP | Age: 49
End: 2024-11-15
Payer: COMMERCIAL

## 2024-11-15 ENCOUNTER — HOSPITAL ENCOUNTER (OUTPATIENT)
Facility: MEDICAL CENTER | Age: 49
End: 2024-11-15
Payer: COMMERCIAL

## 2024-11-15 VITALS
DIASTOLIC BLOOD PRESSURE: 104 MMHG | BODY MASS INDEX: 40.5 KG/M2 | TEMPERATURE: 98.6 F | HEIGHT: 72 IN | SYSTOLIC BLOOD PRESSURE: 162 MMHG | WEIGHT: 299 LBS | HEART RATE: 87 BPM | OXYGEN SATURATION: 99 %

## 2024-11-15 DIAGNOSIS — I10 PRIMARY HYPERTENSION: ICD-10-CM

## 2024-11-15 DIAGNOSIS — R79.89 LOW TESTOSTERONE IN MALE: ICD-10-CM

## 2024-11-15 DIAGNOSIS — F41.1 GENERALIZED ANXIETY DISORDER: ICD-10-CM

## 2024-11-15 DIAGNOSIS — E78.2 MIXED HYPERLIPIDEMIA: ICD-10-CM

## 2024-11-15 DIAGNOSIS — E66.01 CLASS 3 SEVERE OBESITY DUE TO EXCESS CALORIES WITH SERIOUS COMORBIDITY AND BODY MASS INDEX (BMI) OF 40.0 TO 44.9 IN ADULT (HCC): ICD-10-CM

## 2024-11-15 DIAGNOSIS — R31.0 GROSS HEMATURIA: ICD-10-CM

## 2024-11-15 DIAGNOSIS — E66.813 CLASS 3 SEVERE OBESITY DUE TO EXCESS CALORIES WITH SERIOUS COMORBIDITY AND BODY MASS INDEX (BMI) OF 40.0 TO 44.9 IN ADULT (HCC): ICD-10-CM

## 2024-11-15 DIAGNOSIS — E55.9 VITAMIN D DEFICIENCY: ICD-10-CM

## 2024-11-15 DIAGNOSIS — Z91.89 10 YEAR RISK OF MI OR STROKE 7.5% OR GREATER: ICD-10-CM

## 2024-11-15 LAB
APPEARANCE UR: CLEAR
BILIRUB UR STRIP-MCNC: NORMAL MG/DL
COLOR UR AUTO: YELLOW
GLUCOSE UR STRIP.AUTO-MCNC: NORMAL MG/DL
KETONES UR STRIP.AUTO-MCNC: NORMAL MG/DL
LEUKOCYTE ESTERASE UR QL STRIP.AUTO: NORMAL
NITRITE UR QL STRIP.AUTO: NORMAL
PH UR STRIP.AUTO: 6 [PH] (ref 5–8)
PROT UR QL STRIP: NORMAL MG/DL
RBC UR QL AUTO: NORMAL
SP GR UR STRIP.AUTO: 1.02
UROBILINOGEN UR STRIP-MCNC: 0.2 MG/DL

## 2024-11-15 PROCEDURE — 81002 URINALYSIS NONAUTO W/O SCOPE: CPT

## 2024-11-15 PROCEDURE — 99214 OFFICE O/P EST MOD 30 MIN: CPT

## 2024-11-15 PROCEDURE — 3080F DIAST BP >= 90 MM HG: CPT

## 2024-11-15 PROCEDURE — 82043 UR ALBUMIN QUANTITATIVE: CPT

## 2024-11-15 PROCEDURE — 3077F SYST BP >= 140 MM HG: CPT

## 2024-11-15 PROCEDURE — 82570 ASSAY OF URINE CREATININE: CPT

## 2024-11-15 RX ORDER — LISINOPRIL AND HYDROCHLOROTHIAZIDE 12.5; 2 MG/1; MG/1
1 TABLET ORAL DAILY
Qty: 90 TABLET | Refills: 3 | Status: SHIPPED | OUTPATIENT
Start: 2024-11-15

## 2024-11-15 ASSESSMENT — ENCOUNTER SYMPTOMS
DIZZINESS: 1
HEADACHES: 1
PALPITATIONS: 0
BLURRED VISION: 0

## 2024-11-15 ASSESSMENT — FIBROSIS 4 INDEX: FIB4 SCORE: 0.81

## 2024-11-15 NOTE — ASSESSMENT & PLAN NOTE
Chronic, reports very dark urine, and looks like blood. Pt showed me a picture of dried urine on toilet rim which is genie in color, and expressed concern. Discussed result of last UA, and ongoing dark color of urine.      Orders:    POCT Urinalysis

## 2024-11-15 NOTE — PROGRESS NOTES
Subjective:     CC: Diagnoses of Generalized anxiety disorder, Low testosterone in male, Vitamin D deficiency, Gross hematuria, Primary hypertension, Class 3 severe obesity due to excess calories with serious comorbidity and body mass index (BMI) of 40.0 to 44.9 in adult (HCC), Mixed hyperlipidemia, and 10 year risk of MI or stroke 7.5% or greater were pertinent to this visit.    Pt presents today accompanied by spouse kelsie. He is here for lab follow up, although all labs have not resulted. He is interested in testosterone replacement which Dr. Hilliard was providing for him.      HPI:   Vincent presents today with    Problem   Gross Hematuria   Low Testosterone in Male   Primary Hypertension   Class 3 Severe Obesity Due to Excess Calories With Serious Comorbidity and Body Mass Index (Bmi) of 40.0 to 44.9 in Adult (Hcc)   Hyperlipidemia   10 Year Risk of MI Or Stroke 7.5% Or Greater       ROS:  Review of Systems   Eyes:  Negative for blurred vision.   Cardiovascular:  Negative for chest pain and palpitations.   Neurological:  Positive for dizziness (intermittently) and headaches.   All other systems reviewed and are negative.      Objective:     Exam:  BP (!) 162/104 (BP Location: Left arm, Patient Position: Sitting, BP Cuff Size: Large adult)   Pulse 87   Temp 37 °C (98.6 °F) (Temporal)   Ht 1.829 m (6')   Wt (!) 136 kg (299 lb)   SpO2 99%   BMI 40.55 kg/m²  Body mass index is 40.55 kg/m².    Physical Exam  Vitals reviewed.   Constitutional:       General: He is not in acute distress.     Appearance: Normal appearance. He is obese. He is not ill-appearing.   HENT:      Head: Normocephalic and atraumatic.   Cardiovascular:      Rate and Rhythm: Normal rate.      Pulses: Normal pulses.   Pulmonary:      Effort: Pulmonary effort is normal. No respiratory distress.   Musculoskeletal:      Right lower le+ Edema present.      Left lower le+ Edema present.   Skin:     General: Skin is warm and dry.       Findings: No rash.   Neurological:      General: No focal deficit present.      Mental Status: He is alert and oriented to person, place, and time.   Psychiatric:         Mood and Affect: Mood normal.         Behavior: Behavior normal.         Labs:    Latest Reference Range & Units 11/13/24 11:23   WBC 4.8 - 10.8 K/uL 7.2   RBC 4.70 - 6.10 M/uL 6.44 (H)   Hemoglobin 14.0 - 18.0 g/dL 18.3 (H)   Hematocrit 42.0 - 52.0 % 57.1 (H)   MCV 81.4 - 97.8 fL 88.7   MCH 27.0 - 33.0 pg 28.4   MCHC 32.3 - 36.5 g/dL 32.0 (L)   RDW 35.9 - 50.0 fL 43.8   Platelet Count 164 - 446 K/uL 252   MPV 9.0 - 12.9 fL 10.7   Neutrophils-Polys 44.00 - 72.00 % 71.70   Neutrophils (Absolute) 1.82 - 7.42 K/uL 5.18   Lymphocytes 22.00 - 41.00 % 15.00 (L)   Lymphs (Absolute) 1.00 - 4.80 K/uL 1.08   Monocytes 0.00 - 13.40 % 11.50   Monos (Absolute) 0.00 - 0.85 K/uL 0.83   Eosinophils 0.00 - 6.90 % 1.10   Eos (Absolute) 0.00 - 0.51 K/uL 0.08   Basophils 0.00 - 1.80 % 0.40   Baso (Absolute) 0.00 - 0.12 K/uL 0.03   Immature Granulocytes 0.00 - 0.90 % 0.30   Immature Granulocytes (abs) 0.00 - 0.11 K/uL 0.02   Nucleated RBC 0.00 - 0.20 /100 WBC 0.00   NRBC (Absolute) K/uL 0.00   Cholesterol,Tot 100 - 199 mg/dL 185   Triglycerides 0 - 149 mg/dL 250 (H)   HDL >=40 mg/dL 20 !   LDL <100 mg/dL 115 (H)   Hepatitis C Antibody Non-Reactive  Non-Reactive   (H): Data is abnormally high  (L): Data is abnormally low  !: Data is abnormal    Assessment & Plan:     49 y.o. male with the following -     Assessment & Plan  Generalized anxiety disorder    Orders:    FLUoxetine (PROZAC) 20 MG Cap; Take 1 Capsule by mouth every day.    Low testosterone in male  Labs completed 11/13 not resulted at the time of this visit for testosterone, recommended routine blood donation to keep RBC normalized.           Vitamin D deficiency    Orders:    Cholecalciferol (VITAMIN D3) 125 MCG (5000 UT) Cap; Take 1 Capsule by mouth every day.    Gross hematuria  Chronic, reports very dark  urine, and looks like blood. Pt showed me a picture of dried urine on toilet rim which is genie in color, and expressed concern. Discussed result of last UA, and ongoing dark color of urine.      Orders:    POCT Urinalysis    Primary hypertension  Chronic, unstable. BP in clinic today 150/120, recheck by me 162/104. Discussed healthy lifestyle recommendations.     Not taking anything for this.  Denies dizziness, vision changes, chest pain, palpitations, shortness of breath, swelling to lower extremities. Reports headaches, some trace swelling to BLE.    Start lisinopril/hctz 20/12.5 mg  F/u 4 weeks  Goal <130/80  Home blood pressure log.      Orders:    lisinopril-hydrochlorothiazide (PRINZIDE) 20-12.5 MG per tablet; Take 1 Tablet by mouth every day.    Basic Metabolic Panel; Future    MICROALBUMIN CREAT RATIO URINE; Future    Class 3 severe obesity due to excess calories with serious comorbidity and body mass index (BMI) of 40.0 to 44.9 in adult (HCC)    Orders:    MICROALBUMIN CREAT RATIO URINE; Future    Mixed hyperlipidemia  Chronic, unstable. Discussed healthy lifestyle recommendations.   The 10-year ASCVD risk score (Humberto WILLIAMSON, et al., 2019) is: 13%           10 year risk of MI or stroke 7.5% or greater  Chronic, unstable.   The 10-year ASCVD risk score (Humberto WILLIAMSON, et al., 2019) is: 13%  Discussed healthy lifestyle recommendations.             Patient was educated in proper administration of medication(s) ordered today including safety, possible SE, risks, benefits, rationale and alternatives to therapy.   Supportive care, differential diagnoses, and indications for immediate follow-up discussed with patient.    Pathogenesis of diagnosis discussed including typical length and natural progression.    Instructed to return to clinic or nearest emergency department for any change in condition, further concerns, or worsening of symptoms.  Patient states understanding of the plan of care and discharge  instructions.    Return in about 4 weeks (around 12/13/2024), or if symptoms worsen or fail to improve, for Blood Pressure.    Please note that this dictation was created using voice recognition software. I have made every reasonable attempt to correct obvious errors, but I expect that there are errors of grammar and possibly content that I did not discover before finalizing the note.

## 2024-11-15 NOTE — ASSESSMENT & PLAN NOTE
Chronic, unstable. BP in clinic today 150/120, recheck by me 162/104. Discussed healthy lifestyle recommendations.     Not taking anything for this.  Denies dizziness, vision changes, chest pain, palpitations, shortness of breath, swelling to lower extremities. Reports headaches, some trace swelling to BLE.    Start lisinopril/hctz 20/12.5 mg  F/u 4 weeks  Goal <130/80  Home blood pressure log.      Orders:    lisinopril-hydrochlorothiazide (PRINZIDE) 20-12.5 MG per tablet; Take 1 Tablet by mouth every day.    Basic Metabolic Panel; Future    MICROALBUMIN CREAT RATIO URINE; Future

## 2024-11-15 NOTE — ASSESSMENT & PLAN NOTE
Chronic, unstable.   The 10-year ASCVD risk score (Humberto WILLIAMSON, et al., 2019) is: 13%  Discussed healthy lifestyle recommendations.

## 2024-11-15 NOTE — ASSESSMENT & PLAN NOTE
Labs completed 11/13 not resulted at the time of this visit for testosterone, recommended routine blood donation to keep RBC normalized.

## 2024-11-15 NOTE — ASSESSMENT & PLAN NOTE
Chronic, unstable. Discussed healthy lifestyle recommendations.   The 10-year ASCVD risk score (Humberto WILLIAMSON, et al., 2019) is: 13%

## 2024-11-16 LAB
CREAT UR-MCNC: 134.77 MG/DL
MICROALBUMIN UR-MCNC: 3.4 MG/DL
MICROALBUMIN/CREAT UR: 25 MG/G (ref 0–30)
SHBG SERPL-SCNC: 19 NMOL/L (ref 17–56)
TESTOST FREE MFR SERPL: 2.5 % (ref 1.6–2.9)
TESTOST FREE SERPL-MCNC: 142 PG/ML (ref 47–244)
TESTOST SERPL-MCNC: 578 NG/DL (ref 300–890)

## 2024-11-19 ENCOUNTER — TELEPHONE (OUTPATIENT)
Dept: HEALTH INFORMATION MANAGEMENT | Facility: OTHER | Age: 49
End: 2024-11-19
Payer: COMMERCIAL

## 2024-12-11 ENCOUNTER — HOSPITAL ENCOUNTER (OUTPATIENT)
Dept: LAB | Facility: MEDICAL CENTER | Age: 49
End: 2024-12-11
Payer: COMMERCIAL

## 2024-12-11 DIAGNOSIS — I10 PRIMARY HYPERTENSION: ICD-10-CM

## 2024-12-11 LAB
ANION GAP SERPL CALC-SCNC: 10 MMOL/L (ref 7–16)
BUN SERPL-MCNC: 20 MG/DL (ref 8–22)
CALCIUM SERPL-MCNC: 9.4 MG/DL (ref 8.5–10.5)
CHLORIDE SERPL-SCNC: 93 MMOL/L (ref 96–112)
CO2 SERPL-SCNC: 30 MMOL/L (ref 20–33)
CREAT SERPL-MCNC: 1.12 MG/DL (ref 0.5–1.4)
GFR SERPLBLD CREATININE-BSD FMLA CKD-EPI: 80 ML/MIN/1.73 M 2
GLUCOSE SERPL-MCNC: 89 MG/DL (ref 65–99)
POTASSIUM SERPL-SCNC: 4.1 MMOL/L (ref 3.6–5.5)
SODIUM SERPL-SCNC: 133 MMOL/L (ref 135–145)

## 2024-12-11 PROCEDURE — 80048 BASIC METABOLIC PNL TOTAL CA: CPT

## 2024-12-11 PROCEDURE — 36415 COLL VENOUS BLD VENIPUNCTURE: CPT

## 2024-12-13 ENCOUNTER — OFFICE VISIT (OUTPATIENT)
Dept: MEDICAL GROUP | Facility: PHYSICIAN GROUP | Age: 49
End: 2024-12-13
Payer: COMMERCIAL

## 2024-12-13 ENCOUNTER — HOSPITAL ENCOUNTER (OUTPATIENT)
Facility: MEDICAL CENTER | Age: 49
End: 2024-12-13
Payer: COMMERCIAL

## 2024-12-13 VITALS
TEMPERATURE: 98.6 F | WEIGHT: 299 LBS | DIASTOLIC BLOOD PRESSURE: 88 MMHG | HEIGHT: 72 IN | HEART RATE: 102 BPM | SYSTOLIC BLOOD PRESSURE: 122 MMHG | BODY MASS INDEX: 40.5 KG/M2 | RESPIRATION RATE: 18 BRPM

## 2024-12-13 DIAGNOSIS — R79.89 LOW TESTOSTERONE IN MALE: ICD-10-CM

## 2024-12-13 DIAGNOSIS — I10 PRIMARY HYPERTENSION: ICD-10-CM

## 2024-12-13 DIAGNOSIS — Z79.890 ENCOUNTER FOR MONITORING TESTOSTERONE REPLACEMENT THERAPY: ICD-10-CM

## 2024-12-13 DIAGNOSIS — Z79.899 HIGH RISK MEDICATION USE: ICD-10-CM

## 2024-12-13 DIAGNOSIS — E29.1 PRIMARY HYPOGONADISM IN MALE: ICD-10-CM

## 2024-12-13 DIAGNOSIS — Z51.81 ENCOUNTER FOR MONITORING TESTOSTERONE REPLACEMENT THERAPY: ICD-10-CM

## 2024-12-13 PROCEDURE — 99214 OFFICE O/P EST MOD 30 MIN: CPT

## 2024-12-13 PROCEDURE — 3079F DIAST BP 80-89 MM HG: CPT

## 2024-12-13 PROCEDURE — 3074F SYST BP LT 130 MM HG: CPT

## 2024-12-13 PROCEDURE — 80307 DRUG TEST PRSMV CHEM ANLYZR: CPT

## 2024-12-13 RX ORDER — SYRINGE-NEEDLE,INSULIN,0.5 ML 28GX1/2"
1 SYRINGE, EMPTY DISPOSABLE MISCELLANEOUS
Qty: 25 EACH | Refills: 3 | Status: SHIPPED | OUTPATIENT
Start: 2024-12-13

## 2024-12-13 RX ORDER — TESTOSTERONE CYPIONATE 200 MG/ML
120 INJECTION, SOLUTION INTRAMUSCULAR
Qty: 7 ML | Refills: 0 | Status: SHIPPED | OUTPATIENT
Start: 2024-12-13 | End: 2025-03-05

## 2024-12-13 ASSESSMENT — FIBROSIS 4 INDEX: FIB4 SCORE: 0.81

## 2024-12-13 NOTE — PROGRESS NOTES
Verbal consent was acquired by the patient to use [employer] ambient listening note generation during this visit     Subjective:     CC: Diagnoses of Primary hypertension, Primary hypogonadism in male, Encounter for monitoring testosterone replacement therapy, High risk medication use, and Low testosterone in male were pertinent to this visit.    HPI:   Vincent presents today with    History of Present Illness  The patient is a 49-year-old male who presents for evaluation of elevated blood pressure, testosterone therapy, low sodium level, and weight management.    He reports a general sense of well-being at home, with no adverse reactions to his current regimen of lisinopril and hydrochlorothiazide. He is also on Prozac and vitamin D supplements, both of which he tolerates well.    He has been prescribed testosterone, with the most recent dosage being 0.6. He is seeking a refill of this prescription. He has been advised to donate blood, which he believes will help normalize his slightly elevated blood count.    He has observed a trend of high sodium levels in his lab results and is curious if this could be attributed to his antihypertensive medication.    He has initiated a workout routine and increased his physical activity, although he acknowledges that it will take time to see significant changes. He has incorporated weightlifting into his exercise regimen, having previously focused on cardio exercises. He has experienced weight loss through cardio in the past but found it unsustainable as he would regain the lost weight upon discontinuing the exercise. He is considering alternative approaches to weight management, as he feels that his current diet and exercise routine are not yielding the desired results.    Supplemental Information  He is scheduled for a sleep study at the beginning of the new year.    SOCIAL HISTORY  He admits to drinking alcohol heavily in the past, particularly between his late 20s and  "about 40 years old, which contributed to significant weight gain.    FAMILY HISTORY  His father was 500 to 600 pounds and had severe diabetes.    MEDICATIONS  lisinopril, hydrochlorothiazide, Prozac, vitamin D, testosterone    Current Outpatient Medications   Medication Sig Dispense Refill    testosterone cypionate (DEPO-TESTOSTERONE) 200 MG/ML injection Inject 0.6 mL into the shoulder, thigh, or buttocks every 7 days for 82 days. 7 mL 0    TUBERCULIN SYR 1CC/27GX1/2\" 27G X 1/2\" 1 ML Misc 1 Each every 7 days. 25 Each 3    FLUoxetine (PROZAC) 20 MG Cap Take 1 Capsule by mouth every day. 90 Capsule 3    Cholecalciferol (VITAMIN D3) 125 MCG (5000 UT) Cap Take 1 Capsule by mouth every day. 90 Capsule 3    lisinopril-hydrochlorothiazide (PRINZIDE) 20-12.5 MG per tablet Take 1 Tablet by mouth every day. 90 Tablet 3    syringe (EASY GLIDE LUER LOCK SYRINGE) 1 ML Misc Use once weekly for testosterone injections 100 Each 3    NEEDLE, DISP, 25 G 25G X 1-1/2\" Misc Use weekly as directed for testosterone injections 100 Each 3     No current facility-administered medications for this visit.       Problem   Low Testosterone in Male    CS/UDS 12/13/24     Primary Hypertension     ROS:  Review of Systems   All other systems reviewed and are negative.      Objective:     Exam:  /88 (BP Location: Left arm, Patient Position: Sitting, BP Cuff Size: Adult)   Pulse (!) 102   Temp 37 °C (98.6 °F) (Temporal)   Resp 18   Ht 1.829 m (6')   Wt (!) 136 kg (299 lb)   BMI 40.55 kg/m²  Body mass index is 40.55 kg/m².    Physical Exam  Vitals reviewed.   Constitutional:       General: He is not in acute distress.     Appearance: Normal appearance. He is not ill-appearing.   HENT:      Head: Normocephalic and atraumatic.   Cardiovascular:      Rate and Rhythm: Normal rate.      Pulses: Normal pulses.   Pulmonary:      Effort: Pulmonary effort is normal. No respiratory distress.   Skin:     General: Skin is warm and dry.      Findings: " No rash.   Neurological:      General: No focal deficit present.      Mental Status: He is alert and oriented to person, place, and time.   Psychiatric:         Mood and Affect: Mood normal.         Behavior: Behavior normal.         Labs:    Latest Reference Range & Units 11/13/24 11:23 11/15/24 11:29 11/15/24 12:55 12/11/24 10:03   WBC 4.8 - 10.8 K/uL 7.2      RBC 4.70 - 6.10 M/uL 6.44 (H)      Hemoglobin 14.0 - 18.0 g/dL 18.3 (H)      Hematocrit 42.0 - 52.0 % 57.1 (H)      MCV 81.4 - 97.8 fL 88.7      MCH 27.0 - 33.0 pg 28.4      MCHC 32.3 - 36.5 g/dL 32.0 (L)      RDW 35.9 - 50.0 fL 43.8      Platelet Count 164 - 446 K/uL 252      MPV 9.0 - 12.9 fL 10.7      Neutrophils-Polys 44.00 - 72.00 % 71.70      Neutrophils (Absolute) 1.82 - 7.42 K/uL 5.18      Lymphocytes 22.00 - 41.00 % 15.00 (L)      Lymphs (Absolute) 1.00 - 4.80 K/uL 1.08      Monocytes 0.00 - 13.40 % 11.50      Monos (Absolute) 0.00 - 0.85 K/uL 0.83      Eosinophils 0.00 - 6.90 % 1.10      Eos (Absolute) 0.00 - 0.51 K/uL 0.08      Basophils 0.00 - 1.80 % 0.40      Baso (Absolute) 0.00 - 0.12 K/uL 0.03      Immature Granulocytes 0.00 - 0.90 % 0.30      Immature Granulocytes (abs) 0.00 - 0.11 K/uL 0.02      Nucleated RBC 0.00 - 0.20 /100 WBC 0.00      NRBC (Absolute) K/uL 0.00      Sodium 135 - 145 mmol/L    133 (L)   Potassium 3.6 - 5.5 mmol/L    4.1   Chloride 96 - 112 mmol/L    93 (L)   Co2 20 - 33 mmol/L    30   Anion Gap 7.0 - 16.0     10.0   Glucose 65 - 99 mg/dL    89   Bun 8 - 22 mg/dL    20   Creatinine 0.50 - 1.40 mg/dL    1.12   GFR (CKD-EPI) >60 mL/min/1.73 m 2    80   Calcium 8.5 - 10.5 mg/dL    9.4   Cholesterol,Tot 100 - 199 mg/dL 185      Triglycerides 0 - 149 mg/dL 250 (H)      HDL >=40 mg/dL 20 !      LDL <100 mg/dL 115 (H)      Micro Alb Creat Ratio 0 - 30 mg/g  25     Creatinine, Urine mg/dL  134.77     Microalbumin, Urine Random mg/dL  3.4     POC Color Negative    yellow    POC Appearance Negative    clear    POC Specific Gravity  <1.005 - >1.030    1.025    POC Urine PH 5.0 - 8.0    6.0    POC Glucose Negative mg/dL   neg    POC Ketones Negative mg/dL   neg    POC Protein Negative mg/dL   neg    POC Nitrites Negative    neg    POC Leukocyte Esterase Negative    neg    POC Blood Negative    neg    POC Bilirubin Negative mg/dL   neg    POC Urobiligen Negative (0.2) mg/dL   0.2    Hepatitis C Antibody Non-Reactive  Non-Reactive      Free Testosterone 47 - 244 pg/mL 142      Sex Hormone Bind Globulin 17 - 56 nmol/L 19      Testosterone % Free 1.6 - 2.9 % 2.5      Testosterone,Total 300 - 890 ng/dL 578      (H): Data is abnormally high  (L): Data is abnormally low  !: Data is abnormal    Assessment & Plan:     49 y.o. male with the following -     Assessment & Plan  1. Elevated blood pressure.  Blood pressure readings have shown improvement. He is currently taking lisinopril and hydrochlorothiazide without any issues. He is advised to continue his current medication regimen.    2. Testosterone therapy.  A controlled substance agreement will be established. A urine drug test will be conducted today. His blood count will be closely monitored, with a recheck scheduled for every other month. He is advised to donate blood once every other month. A repeat CBC will be ordered in January 2025. A prescription for testosterone will be sent to [facility].    3. Hyponatremia.  Sodium levels were slightly below the normal range, potentially due to his antihypertensive medication. Potassium levels remain within the normal range. Sodium levels will be closely monitored. He is advised to moderately increase his salt intake.    4. Weight management.  He is encouraged to maintain a regular exercise routine and adopt a healthy diet. The benefits of resistance training and muscle building were discussed, emphasizing that these activities can enhance caloric efficiency during cardio exercises.    Follow-up  The patient will follow up in 3 months.    Problem List Items  "Addressed This Visit          Family Medicine Problems    Primary hypertension     Chronic, stable. Bp in clinic today 122/88. Continue lisinopril/hctz 20/12.5 mg daily.            Other    Low testosterone in male     Chronic, stable. Will continue testosterone 0.6 ml weekly. Plan to recheck cbc every 8 weeks, recommended therapeutic phlebotomy for elevated RBC/h/h              Other Visit Diagnoses       Primary hypogonadism in male        Relevant Medications    testosterone cypionate (DEPO-TESTOSTERONE) 200 MG/ML injection    TUBERCULIN SYR 1CC/27GX1/2\" 27G X 1/2\" 1 ML Misc    Other Relevant Orders    CBC WITHOUT DIFFERENTIAL    Encounter for monitoring testosterone replacement therapy        Relevant Medications    testosterone cypionate (DEPO-TESTOSTERONE) 200 MG/ML injection    Other Relevant Orders    CBC WITHOUT DIFFERENTIAL    High risk medication use        Relevant Orders    Controlled Substance Treatment Agreement    PAIN MANAGEMENT PANEL, SCRN W/ RFLX TO QNT          Patient was educated in proper administration of medication(s) ordered today including safety, possible SE, risks, benefits, rationale and alternatives to therapy.   Supportive care, differential diagnoses, and indications for immediate follow-up discussed with patient.    Pathogenesis of diagnosis discussed including typical length and natural progression.    Instructed to return to clinic or nearest emergency department for any change in condition, further concerns, or worsening of symptoms.  Patient states understanding of the plan of care and discharge instructions.    Return in about 3 months (around 3/13/2025), or if symptoms worsen or fail to improve.    Please note that this dictation was created using voice recognition software. I have made every reasonable attempt to correct obvious errors, but I expect that there are errors of grammar and possibly content that I did not discover before finalizing the note.        "

## 2024-12-13 NOTE — ASSESSMENT & PLAN NOTE
Chronic, stable. Will continue testosterone 0.6 ml weekly. Plan to recheck cbc every 8 weeks, recommended therapeutic phlebotomy for elevated RBC/h/h

## 2024-12-15 LAB
AMPHET CTO UR CFM-MCNC: NEGATIVE NG/ML
BARBITURATES CTO UR CFM-MCNC: NEGATIVE NG/ML
BENZODIAZ CTO UR CFM-MCNC: NEGATIVE NG/ML
BUPRENORPHINE UR-MCNC: NEGATIVE NG/ML
CANNABINOIDS CTO UR CFM-MCNC: NEGATIVE NG/ML
CARISOPRODOL UR-MCNC: NEGATIVE NG/ML
COCAINE CTO UR CFM-MCNC: NEGATIVE NG/ML
CREAT UR-MCNC: 367.9 MG/DL (ref 20–400)
DRUG SCREEN COMMENT UR-IMP: NORMAL
ETHYL GLUCURONIDE UR QL SCN: NEGATIVE NG/ML
FENTANYL UR-MCNC: NEGATIVE NG/ML
MEPERIDINE CTO UR SCN-MCNC: NEGATIVE NG/ML
METHADONE CTO UR CFM-MCNC: NEGATIVE NG/ML
OPIATES UR QL SCN: NEGATIVE NG/ML
OXYCDOXYM URSCRN 2005102: NEGATIVE NG/ML
PCP CTO UR CFM-MCNC: NEGATIVE NG/ML
PROPOXYPH CTO UR CFM-MCNC: NEGATIVE NG/ML
TAPENTADOL UR-MCNC: NEGATIVE NG/ML
TRAMADOL CTO UR SCN-MCNC: NEGATIVE NG/ML
ZOLPIDEM UR-MCNC: NEGATIVE NG/ML

## 2025-02-12 ENCOUNTER — TELEPHONE (OUTPATIENT)
Dept: HEALTH INFORMATION MANAGEMENT | Facility: OTHER | Age: 50
End: 2025-02-12
Payer: COMMERCIAL

## 2025-03-07 ENCOUNTER — RESULTS FOLLOW-UP (OUTPATIENT)
Dept: MEDICAL GROUP | Facility: PHYSICIAN GROUP | Age: 50
End: 2025-03-07
Payer: COMMERCIAL

## 2025-03-07 ENCOUNTER — HOSPITAL ENCOUNTER (OUTPATIENT)
Dept: LAB | Facility: MEDICAL CENTER | Age: 50
End: 2025-03-07
Payer: COMMERCIAL

## 2025-03-07 DIAGNOSIS — E29.1 PRIMARY HYPOGONADISM IN MALE: ICD-10-CM

## 2025-03-07 DIAGNOSIS — Z79.890 ENCOUNTER FOR MONITORING TESTOSTERONE REPLACEMENT THERAPY: ICD-10-CM

## 2025-03-07 DIAGNOSIS — Z51.81 ENCOUNTER FOR MONITORING TESTOSTERONE REPLACEMENT THERAPY: ICD-10-CM

## 2025-03-07 LAB
ERYTHROCYTE [DISTWIDTH] IN BLOOD BY AUTOMATED COUNT: 42.3 FL (ref 35.9–50)
HCT VFR BLD AUTO: 52.9 % (ref 42–52)
HGB BLD-MCNC: 17.5 G/DL (ref 14–18)
MCH RBC QN AUTO: 28.5 PG (ref 27–33)
MCHC RBC AUTO-ENTMCNC: 33.1 G/DL (ref 32.3–36.5)
MCV RBC AUTO: 86.3 FL (ref 81.4–97.8)
PLATELET # BLD AUTO: 227 K/UL (ref 164–446)
PMV BLD AUTO: 11.1 FL (ref 9–12.9)
RBC # BLD AUTO: 6.13 M/UL (ref 4.7–6.1)
WBC # BLD AUTO: 6.3 K/UL (ref 4.8–10.8)

## 2025-03-07 PROCEDURE — 85027 COMPLETE CBC AUTOMATED: CPT

## 2025-03-07 PROCEDURE — 36415 COLL VENOUS BLD VENIPUNCTURE: CPT

## 2025-03-13 ENCOUNTER — OFFICE VISIT (OUTPATIENT)
Dept: MEDICAL GROUP | Facility: PHYSICIAN GROUP | Age: 50
End: 2025-03-13
Payer: COMMERCIAL

## 2025-03-13 VITALS
DIASTOLIC BLOOD PRESSURE: 62 MMHG | OXYGEN SATURATION: 94 % | WEIGHT: 300 LBS | HEIGHT: 72 IN | RESPIRATION RATE: 20 BRPM | SYSTOLIC BLOOD PRESSURE: 102 MMHG | TEMPERATURE: 98.1 F | HEART RATE: 112 BPM | BODY MASS INDEX: 40.63 KG/M2

## 2025-03-13 DIAGNOSIS — R05.1 ACUTE COUGH: ICD-10-CM

## 2025-03-13 DIAGNOSIS — Z79.890 ENCOUNTER FOR MONITORING TESTOSTERONE REPLACEMENT THERAPY: ICD-10-CM

## 2025-03-13 DIAGNOSIS — I10 PRIMARY HYPERTENSION: ICD-10-CM

## 2025-03-13 DIAGNOSIS — Z51.81 ENCOUNTER FOR MONITORING TESTOSTERONE REPLACEMENT THERAPY: ICD-10-CM

## 2025-03-13 DIAGNOSIS — J06.9 ACUTE URI: ICD-10-CM

## 2025-03-13 DIAGNOSIS — E29.1 PRIMARY HYPOGONADISM IN MALE: ICD-10-CM

## 2025-03-13 DIAGNOSIS — R79.89 LOW TESTOSTERONE IN MALE: ICD-10-CM

## 2025-03-13 LAB
HETEROPH AB SER QL LA: NEGATIVE
POCT INT CON NEG: NEGATIVE
POCT INT CON POS: POSITIVE

## 2025-03-13 PROCEDURE — 86308 HETEROPHILE ANTIBODY SCREEN: CPT

## 2025-03-13 PROCEDURE — 99214 OFFICE O/P EST MOD 30 MIN: CPT

## 2025-03-13 PROCEDURE — 3078F DIAST BP <80 MM HG: CPT

## 2025-03-13 PROCEDURE — 3074F SYST BP LT 130 MM HG: CPT

## 2025-03-13 RX ORDER — TESTOSTERONE CYPIONATE 200 MG/ML
120 INJECTION, SOLUTION INTRAMUSCULAR
Qty: 7 ML | Refills: 0 | Status: SHIPPED | OUTPATIENT
Start: 2025-03-13 | End: 2025-06-03

## 2025-03-13 RX ORDER — ALBUTEROL SULFATE 90 UG/1
2 INHALANT RESPIRATORY (INHALATION) EVERY 4 HOURS PRN
Qty: 8.5 G | Refills: 0 | Status: SHIPPED | OUTPATIENT
Start: 2025-03-13

## 2025-03-13 ASSESSMENT — PATIENT HEALTH QUESTIONNAIRE - PHQ9: CLINICAL INTERPRETATION OF PHQ2 SCORE: 0

## 2025-03-13 NOTE — PROGRESS NOTES
"Chief Complaint   Patient presents with    Hypertension Follow-up    URI     Pt states that it hasn't hit the chest yet  [place]; Pt states that this has been going on since Jan.     Cough     Dry/productive; onset Jan         HPI: Patient is a 50 y.o. male complaining of 3 months of illness including: productive of clear sputum cough, sore throat, fatigue, headache .   Mucus is: thick.  Similarly ill exposures: yes.  Treatments tried: otc dayquil   He  reports that he has never smoked. He has never used smokeless tobacco..     ROS:  No fever, or skin rash. Reports diarrhea, fatigue, eye drainage, cough, headache, ear itching.     I reviewed the patient's medications, allergies and medical history:  Current Outpatient Medications   Medication Sig Dispense Refill    albuterol (PROAIR HFA) 108 (90 Base) MCG/ACT Aero Soln inhalation aerosol Inhale 2 Puffs every four hours as needed for Shortness of Breath. 8.5 g 0    testosterone cypionate (DEPO-TESTOSTERONE) 200 MG/ML injection Inject 0.6 mL into the shoulder, thigh, or buttocks every 7 days for 82 days. 7 mL 0    TUBERCULIN SYR 1CC/27GX1/2\" 27G X 1/2\" 1 ML Misc 1 Each every 7 days. 25 Each 3    FLUoxetine (PROZAC) 20 MG Cap Take 1 Capsule by mouth every day. 90 Capsule 3    Cholecalciferol (VITAMIN D3) 125 MCG (5000 UT) Cap Take 1 Capsule by mouth every day. 90 Capsule 3    lisinopril-hydrochlorothiazide (PRINZIDE) 20-12.5 MG per tablet Take 1 Tablet by mouth every day. 90 Tablet 3    syringe (EASY GLIDE LUER LOCK SYRINGE) 1 ML Misc Use once weekly for testosterone injections 100 Each 3    NEEDLE, DISP, 25 G 25G X 1-1/2\" Misc Use weekly as directed for testosterone injections 100 Each 3     No current facility-administered medications for this visit.     Patient has no known allergies.  Past Medical History:   Diagnosis Date    Asthma     Bronchitis     Chickenpox     Nasal drainage     Pneumonia     Tonsillitis         EXAM:  /62 (BP Location: Right " arm, Patient Position: Sitting, BP Cuff Size: Adult long)   Pulse (!) 112   Temp 36.7 °C (98.1 °F) (Temporal)   Resp 20   Ht 1.829 m (6')   Wt (!) 136 kg (300 lb)   SpO2 94%   General: Alert, no conversational dyspnea or audible wheeze, non-toxic appearance.  Eyes: PERRL, conjunctiva slightly injected, left eye white discharge.  Ears: Normal pinnae,TM's scarred bilaterally.  Nares: Patent with thin mucus.  Sinuses: non tender over maxillary / frontal sinuses.  Throat: Erythematous injection without exudate.   Neck: Supple, with mildly enlarged cervical nodes.  Lungs: Clear to auscultation bilaterally, no wheeze, crackles or rhonchi.   Heart: Regular rate without murmur.  Skin: Warm and dry without rash.     ASSESSMENT:   1. Primary hypertension    2. Acute cough    3. Acute URI    4. Primary hypogonadism in male    5. Encounter for monitoring testosterone replacement therapy    6. Low testosterone in male            PLAN:  1. Educated patient that majority of upper respiratory infections are viral and do not need antibiotics. Since symptoms have been ongoing for >3 months will provide oral antibiotics.  2. Twice daily use of nasal saline rinse or Neti-Pot.  3. OTC anti-pyretics and decongestants as needed.  4. Follow-up in office or urgent care for worsening symptoms, difficulty breathing, lack of expected recovery, or should new symptoms or problems arise.

## 2025-08-28 ENCOUNTER — HOSPITAL ENCOUNTER (OUTPATIENT)
Dept: LAB | Facility: MEDICAL CENTER | Age: 50
End: 2025-08-28
Payer: COMMERCIAL

## 2025-08-28 DIAGNOSIS — E29.1 PRIMARY HYPOGONADISM IN MALE: ICD-10-CM

## 2025-08-28 DIAGNOSIS — Z79.890 ENCOUNTER FOR MONITORING TESTOSTERONE REPLACEMENT THERAPY: ICD-10-CM

## 2025-08-28 DIAGNOSIS — Z51.81 ENCOUNTER FOR MONITORING TESTOSTERONE REPLACEMENT THERAPY: ICD-10-CM

## 2025-08-28 LAB
ERYTHROCYTE [DISTWIDTH] IN BLOOD BY AUTOMATED COUNT: 42.5 FL (ref 35.9–50)
HCT VFR BLD AUTO: 52.2 % (ref 42–52)
HGB BLD-MCNC: 17.5 G/DL (ref 14–18)
MCH RBC QN AUTO: 28.8 PG (ref 27–33)
MCHC RBC AUTO-ENTMCNC: 33.5 G/DL (ref 32.3–36.5)
MCV RBC AUTO: 85.9 FL (ref 81.4–97.8)
PLATELET # BLD AUTO: 221 K/UL (ref 164–446)
PMV BLD AUTO: 10.8 FL (ref 9–12.9)
RBC # BLD AUTO: 6.08 M/UL (ref 4.7–6.1)
WBC # BLD AUTO: 5.3 K/UL (ref 4.8–10.8)

## 2025-08-28 PROCEDURE — 36415 COLL VENOUS BLD VENIPUNCTURE: CPT

## 2025-08-28 PROCEDURE — 85027 COMPLETE CBC AUTOMATED: CPT
